# Patient Record
Sex: FEMALE | Race: WHITE | NOT HISPANIC OR LATINO
[De-identification: names, ages, dates, MRNs, and addresses within clinical notes are randomized per-mention and may not be internally consistent; named-entity substitution may affect disease eponyms.]

---

## 2017-05-09 ENCOUNTER — APPOINTMENT (OUTPATIENT)
Dept: BREAST CENTER | Facility: CLINIC | Age: 72
End: 2017-05-09

## 2017-05-09 VITALS
BODY MASS INDEX: 23 KG/M2 | DIASTOLIC BLOOD PRESSURE: 82 MMHG | HEIGHT: 62 IN | SYSTOLIC BLOOD PRESSURE: 130 MMHG | WEIGHT: 125 LBS

## 2017-05-09 DIAGNOSIS — Z78.9 OTHER SPECIFIED HEALTH STATUS: ICD-10-CM

## 2017-05-09 RX ORDER — GLUCOSAMINE/MSM/CHONDROIT SULF 500-166.6
TABLET ORAL
Refills: 0 | Status: ACTIVE | COMMUNITY

## 2017-10-10 ENCOUNTER — APPOINTMENT (OUTPATIENT)
Dept: BREAST CENTER | Facility: CLINIC | Age: 72
End: 2017-10-10
Payer: MEDICARE

## 2017-10-10 VITALS
DIASTOLIC BLOOD PRESSURE: 74 MMHG | HEIGHT: 62 IN | HEART RATE: 78 BPM | OXYGEN SATURATION: 97 % | WEIGHT: 125 LBS | SYSTOLIC BLOOD PRESSURE: 110 MMHG | BODY MASS INDEX: 23 KG/M2

## 2017-10-10 PROCEDURE — 99213 OFFICE O/P EST LOW 20 MIN: CPT

## 2018-04-10 ENCOUNTER — APPOINTMENT (OUTPATIENT)
Dept: BREAST CENTER | Facility: CLINIC | Age: 73
End: 2018-04-10
Payer: MEDICARE

## 2018-04-10 VITALS
HEART RATE: 70 BPM | WEIGHT: 132 LBS | OXYGEN SATURATION: 96 % | SYSTOLIC BLOOD PRESSURE: 118 MMHG | BODY MASS INDEX: 24.29 KG/M2 | DIASTOLIC BLOOD PRESSURE: 74 MMHG | HEIGHT: 62 IN

## 2018-04-10 PROCEDURE — 99213 OFFICE O/P EST LOW 20 MIN: CPT

## 2018-10-16 ENCOUNTER — APPOINTMENT (OUTPATIENT)
Dept: BREAST CENTER | Facility: CLINIC | Age: 73
End: 2018-10-16
Payer: MEDICARE

## 2018-10-16 VITALS
DIASTOLIC BLOOD PRESSURE: 82 MMHG | OXYGEN SATURATION: 98 % | WEIGHT: 128 LBS | SYSTOLIC BLOOD PRESSURE: 130 MMHG | BODY MASS INDEX: 23.55 KG/M2 | HEART RATE: 72 BPM | HEIGHT: 62 IN

## 2018-10-16 PROCEDURE — 99213 OFFICE O/P EST LOW 20 MIN: CPT

## 2019-06-18 ENCOUNTER — APPOINTMENT (OUTPATIENT)
Dept: BREAST CENTER | Facility: CLINIC | Age: 74
End: 2019-06-18

## 2019-08-15 ENCOUNTER — APPOINTMENT (OUTPATIENT)
Dept: BREAST CENTER | Facility: CLINIC | Age: 74
End: 2019-08-15

## 2019-12-19 ENCOUNTER — APPOINTMENT (OUTPATIENT)
Dept: BREAST CENTER | Facility: CLINIC | Age: 74
End: 2019-12-19
Payer: MEDICARE

## 2019-12-19 VITALS
HEIGHT: 62 IN | DIASTOLIC BLOOD PRESSURE: 82 MMHG | BODY MASS INDEX: 23 KG/M2 | SYSTOLIC BLOOD PRESSURE: 132 MMHG | TEMPERATURE: 98.1 F | WEIGHT: 125 LBS

## 2019-12-19 PROCEDURE — 99214 OFFICE O/P EST MOD 30 MIN: CPT

## 2019-12-19 NOTE — REASON FOR VISIT
[Consultation] : a consultation visit [FreeTextEntry1] : Right breast cancer; PMD is Dr. Tolentino.

## 2019-12-19 NOTE — PHYSICAL EXAM
[Normocephalic] : normocephalic [No Supraclavicular Adenopathy] : no supraclavicular adenopathy [Supple] : supple [Atraumatic] : atraumatic [No Cervical Adenopathy] : no cervical adenopathy [No Thyromegaly] : no thyromegaly [Examined in the supine and seated position] : examined in the supine and seated position [Symmetrical] : symmetrical [No dominant masses] : no dominant masses in right breast  [No dominant masses] : no dominant masses left breast [Breast Mass Right Breast ___cm] : no masses [No Nipple Discharge] : no left nipple discharge [No Nipple Retraction] : no left nipple retraction [Breast Mass Left Breast ___cm] : no masses [No Axillary Lymphadenopathy] : no left axillary lymphadenopathy [No Swelling] : no swelling [Full ROM] : full range of motion [No Edema] : no edema [No Ulceration] : no ulceration [No Rashes] : no rashes [Breast Nipple Retraction] : nipples not retracted [Breast Nipple Inversion] : nipples not inverted [Breast Nipple Fissures] : nipples not fissured [Breast Abnormal Lactation (Galactorrhea)] : no galactorrhea [Breast Nipple Flattening] : nipples not flattened [Breast Abnormal Secretion Bloody Fluid] : no bloody discharge [Breast Abnormal Secretion Serous Fluid] : no serous discharge [Breast Abnormal Secretion Opalescent Fluid] : no milky discharge

## 2019-12-19 NOTE — DATA REVIEWED
[FreeTextEntry1] : Study Date:   23 Oct 2019 19:36 \par Referring Phys.:  NEHAL PARADA Performing Location:   [facility]  \par EXAM:  MAMMO COMBO HD ARON SCREENING BILATERAL \par --------------------------------------------------------------------------------\par IMPRESSION:\par Indeterminate mass at right 9:00, recommend Limited right ultrasound for further evaluation.\par An additional imaging exam of the right breast(s) is recommended. The patient will be sent a letter to return for additional imaging.\par BI-RADS 0: INCOMPLETE - NEED ADDITIONAL IMAGING EVALUATION\par MAMMO TOMOSYNTHESIS SCREENING BILATERAL\par Clinical Breast Exam: Patient does report clinical breast exam in the last year.\par Clinical Indication: Routine screening. No family history of breast cancer. Patient has a personal history of right breast cancer.\par Compared to: 10/09/2018, 10/05/2017, 09/27/2016, 09/04/2015, 03/13/2015, and 07/09/2014\par Tomosynthesis and 2D imaging of the breast(s) were performed. Current study was also evaluated with a computer aided detection (CAD) system.\par Breast density: There are scattered areas of fibroglandular density.\par No significant masses, calcifications, or other findings are seen in the left breast. In the right breast. 9:00 3 cm from nipple there is a new circumscribed 0.6 cm oval mass. There are postsurgical changes in the right upper outer quadrant.\par Electronic Signature: I personally reviewed the images and agree with this report. Final Report: Dictated by and Signed by Attending Natalie Morgan MD 10/28/2019 1:35 PM\par \par \par \par  \par \par Study Date:   13 Nov 2019 09:42 \par Referring Phys.:  ELDER BASURTO Performing Location:   [facility]  \par EXAM:  US BREAST \par --------------------------------------------------------------------------------\par IMPRESSION:\par Indeterminant hypoechoic mass in the right breast at 9:00 location 3 cm from the nipple corresponding to the mammographic abnormality for which ultrasound core biopsy is recommended.\par Biopsy of the right breast(s) is recommended. A letter will be sent to the patient to return for a biopsy.\par BI-RADS 4: SUSPICIOUS\par US BREAST COMPLETE RIGHT\par Clinical history: Recalled from a recent screening mammogram\par Compared to: 10/05/2017, 09/04/2015, 07/15/2014, 07/15/2014, 07/15/2014, and 07/09/2014\par A limited ultrasound evaluation of the breast(s) was/were performed.\par In the right breast at 9:00 location 3 cm from the nipple corresponding to the mammographic abnormality there is a hypoechoic mass measuring 0.6 x 0.5 x 0.5 cm. Ultrasound core biopsy is recommended for further evaluation. Postsurgical scar is seen in the upper outer quadrant of the right breast.\par Electronic Signature: I personally reviewed the images and agree with this report. Final Report: Dictated by and Signed by Attending Kerrie Huitron MD 11/13/2019 10:29 AM\par \par \par \par  \par \par Study Date:   03 Dec 2019 08:57 \par Referring Phys.:  NEHAL PARADA Performing Location:   [facility]  \par EXAM:  US BREAST CORE BIOPSY \par --------------------------------------------------------------------------------\par Pathology Report Received From [facility]:\par The pathology report of breast biopsy dated indicated that the target at is MALIGNANT\par DIAGNOSIS:\par 1. 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 She has been advised to contact your office and to arrange for SURGICAL AND ONCOLOGIC CONSULTATION AND MANAGEMENT.\par Surgical consultation of the right breast(s) is recommended. _\par Electronic Signature: I personally reviewed the images and agree with this report. Final Report: Dictated by and Signed by Attending Danica Mccartney MD 12/10/2019 3:14 PM\par *******END OF ADDENDUM******\par \par IMPRESSION:\par Ultrasound guided needle biopsy of the right breast. Pathology pending.\par A final report will be issued when Pathology results are available. _\par US BREAST CORE BIOPSY RIGHT, MAMMO DIGITAL POST PROCEDURE RIGHT\par HISTORY: Ultrasound of the right breast dated 11/13/2019 revealed a nodule at 9 o'clock which was recommended for biopsy.\par Benefits, risks and alternatives to the procedure were explained to the patient and informed written consent was obtained.\par The patient denied taking aspirin or anticoagulants and stated no allergies to topical antiseptic solutions or local anesthetic.\par Utilizing universal protocol, site and patient verification was performed prior to starting the procedure.\par PROCEDURE: After sterile skin preparation, local anesthesia was achieved with 1% lidocaine. Under ultrasound guidance, a 12G vacuum assisted needle biopsy device was used to obtain multiple core specimens.\par After the procedure, a S shaped marking clip was deployed in the area of sampling.\par The patient tolerated the procedure well without immediate complications.\par POST PROCEDURE MAMMOGRAM FOR MARKER PLACEMENT\par A post-procedure mammogram was performed and demonstrates a clip in the appropriate location.\par Electronic Signature: I personally reviewed the images and agree with this report. Final Report: Dictated by and Signed by Attending Danica Mccartney MD 12/3/2019 10:15 AM

## 2019-12-19 NOTE — ASSESSMENT
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   She has a history of right breast DCIS of two areas in 2014 status post lumpectomy and intraoperative radiation therapy demonstrating 4 mm moderately differentiated invasive ductal carcinoma.  She was then status post right sentinel lymph node biopsy demonstrating one negative lymph node.  She was on an aromatase inhibitor but discontinued after three months secondary to side effects.  \par \par On physical exam, there are no discreet masses in either breast or axilla.  There is no nipple discharge or inversion bilaterally.  There are no skin changes bilaterally.  We had a lengthy discussion regarding her diagnosis and treatment options.  She agrees to right breast lumpectomy with needle localization. All of the risks and benefits were discussed.  I spent a total of 25 minutes of face to face time with this patient, greater than 50% of which was spent in counseling and/or coordination of care.  All of her questions were appropriately answered.\par

## 2019-12-19 NOTE — REVIEW OF SYSTEMS
[Fever] : no fever [Chills] : no chills [Breast Lump] : no breast lump [Breast Pain] : no breast pain [Breast Warmth] : no breast warmth [Breast Reddening] : no reddening of the breast [Breast Swelling] : no breast swelling [Breast Itching] : no breast itching [Enlargement] : no breast enlargement [Decreasing In Size] : breast size not decreasing [Dimpling Of Skin] : no dimpling of breast skin ['Orange Peel' Appearance] : no 'orange peel' appearance of breast skin [Nipple Discharge] : no nipple discharge [Nipple Inverted] : no inversion of the nipple

## 2019-12-19 NOTE — HISTORY OF PRESENT ILLNESS
[FreeTextEntry1] : Patient with Right breast DCIS intermediate nuclear grade, papillary on ultrasound core biopsy 7/15/14; 10:00 N6-7, 18 mm.  \par Estrogen receptor positive\par Progesterone receptor positive\par \par Right breast DCIS intermediate nuclear grade, solid and papillary on ultrasound core biopsy 7/15/14; 11:00 N6-7, 3 mm.  \par \par History of Left breast biopsy for benign calcifications.\par Her family history is significant for her niece (sister's daughter) with breast cancer at 51.\par \par s/p Right NLOC of two areas and XOFT 9/15/14 demonstrating 4 mm mod diff IDC, with negative margins; extensive DCIS, micropapillary and solid types, intermediate nuclear grade, close superior, posterior and anterior margin.   No LVI or perineural invasion.\par \par Status post Right SNB 10/27/14 demonstrating 0/1 (-).  (pT1a pN0 pMx)\par \par Patient met with Dr. Wilkes and was on AI for three months, eventually discontinued  Arimidex and letrozole due to side effects.  She was not a candidate for chemotherapy.  \par \par Right breast at least in situ ductal carcinoma with papillary and micropapillary features (DCIS) intermediate nuclear grade on ultrasound core biopsy 12/3/19; 9:00 N3, 6 mm (S-shape).\par Estrogen receptor positive, \par Progesterone receptor positive,

## 2019-12-19 NOTE — PAST MEDICAL HISTORY
[Postmenopausal] : The patient is postmenopausal [FreeTextEntry5] : none [FreeTextEntry6] : none [FreeTextEntry7] : none [FreeTextEntry8] : none

## 2019-12-31 ENCOUNTER — LABORATORY RESULT (OUTPATIENT)
Age: 74
End: 2019-12-31

## 2019-12-31 ENCOUNTER — OUTPATIENT (OUTPATIENT)
Dept: OUTPATIENT SERVICES | Facility: HOSPITAL | Age: 74
LOS: 1 days | Discharge: HOME | End: 2019-12-31

## 2019-12-31 DIAGNOSIS — R89.7 ABNORMAL HISTOLOGICAL FINDINGS IN SPECIMENS FROM OTHER ORGANS, SYSTEMS AND TISSUES: ICD-10-CM

## 2020-01-01 ENCOUNTER — FORM ENCOUNTER (OUTPATIENT)
Age: 75
End: 2020-01-01

## 2020-01-02 ENCOUNTER — OUTPATIENT (OUTPATIENT)
Dept: OUTPATIENT SERVICES | Facility: HOSPITAL | Age: 75
LOS: 1 days | Discharge: HOME | End: 2020-01-02
Payer: MEDICARE

## 2020-01-02 ENCOUNTER — OUTPATIENT (OUTPATIENT)
Dept: OUTPATIENT SERVICES | Facility: HOSPITAL | Age: 75
LOS: 1 days | Discharge: HOME | End: 2020-01-02

## 2020-01-02 VITALS
HEIGHT: 62 IN | TEMPERATURE: 98 F | WEIGHT: 121.92 LBS | DIASTOLIC BLOOD PRESSURE: 76 MMHG | HEART RATE: 76 BPM | SYSTOLIC BLOOD PRESSURE: 130 MMHG | RESPIRATION RATE: 16 BRPM | OXYGEN SATURATION: 98 %

## 2020-01-02 DIAGNOSIS — Z01.818 ENCOUNTER FOR OTHER PREPROCEDURAL EXAMINATION: ICD-10-CM

## 2020-01-02 DIAGNOSIS — D05.11 INTRADUCTAL CARCINOMA IN SITU OF RIGHT BREAST: ICD-10-CM

## 2020-01-02 DIAGNOSIS — Z98.890 OTHER SPECIFIED POSTPROCEDURAL STATES: Chronic | ICD-10-CM

## 2020-01-02 DIAGNOSIS — Z02.9 ENCOUNTER FOR ADMINISTRATIVE EXAMINATIONS, UNSPECIFIED: ICD-10-CM

## 2020-01-02 LAB
ALBUMIN SERPL ELPH-MCNC: 4.4 G/DL — SIGNIFICANT CHANGE UP (ref 3.5–5.2)
ALP SERPL-CCNC: 71 U/L — SIGNIFICANT CHANGE UP (ref 30–115)
ALT FLD-CCNC: 22 U/L — SIGNIFICANT CHANGE UP (ref 0–41)
ANION GAP SERPL CALC-SCNC: 15 MMOL/L — HIGH (ref 7–14)
APTT BLD: 31.4 SEC — SIGNIFICANT CHANGE UP (ref 27–39.2)
AST SERPL-CCNC: 28 U/L — SIGNIFICANT CHANGE UP (ref 0–41)
BASOPHILS # BLD AUTO: 0.1 K/UL — SIGNIFICANT CHANGE UP (ref 0–0.2)
BASOPHILS NFR BLD AUTO: 1.3 % — HIGH (ref 0–1)
BILIRUB SERPL-MCNC: 0.4 MG/DL — SIGNIFICANT CHANGE UP (ref 0.2–1.2)
BUN SERPL-MCNC: 13 MG/DL — SIGNIFICANT CHANGE UP (ref 10–20)
CALCIUM SERPL-MCNC: 9.7 MG/DL — SIGNIFICANT CHANGE UP (ref 8.5–10.1)
CHLORIDE SERPL-SCNC: 95 MMOL/L — LOW (ref 98–110)
CO2 SERPL-SCNC: 27 MMOL/L — SIGNIFICANT CHANGE UP (ref 17–32)
CREAT SERPL-MCNC: 0.7 MG/DL — SIGNIFICANT CHANGE UP (ref 0.7–1.5)
EOSINOPHIL # BLD AUTO: 0.15 K/UL — SIGNIFICANT CHANGE UP (ref 0–0.7)
EOSINOPHIL NFR BLD AUTO: 2 % — SIGNIFICANT CHANGE UP (ref 0–8)
GLUCOSE SERPL-MCNC: 62 MG/DL — LOW (ref 70–99)
HCT VFR BLD CALC: 39.5 % — SIGNIFICANT CHANGE UP (ref 37–47)
HGB BLD-MCNC: 12.7 G/DL — SIGNIFICANT CHANGE UP (ref 12–16)
IMM GRANULOCYTES NFR BLD AUTO: 0.3 % — SIGNIFICANT CHANGE UP (ref 0.1–0.3)
INR BLD: 1.03 RATIO — SIGNIFICANT CHANGE UP (ref 0.65–1.3)
LYMPHOCYTES # BLD AUTO: 1.84 K/UL — SIGNIFICANT CHANGE UP (ref 1.2–3.4)
LYMPHOCYTES # BLD AUTO: 24.4 % — SIGNIFICANT CHANGE UP (ref 20.5–51.1)
MCHC RBC-ENTMCNC: 31.1 PG — HIGH (ref 27–31)
MCHC RBC-ENTMCNC: 32.2 G/DL — SIGNIFICANT CHANGE UP (ref 32–37)
MCV RBC AUTO: 96.8 FL — SIGNIFICANT CHANGE UP (ref 81–99)
MONOCYTES # BLD AUTO: 0.61 K/UL — HIGH (ref 0.1–0.6)
MONOCYTES NFR BLD AUTO: 8.1 % — SIGNIFICANT CHANGE UP (ref 1.7–9.3)
NEUTROPHILS # BLD AUTO: 4.82 K/UL — SIGNIFICANT CHANGE UP (ref 1.4–6.5)
NEUTROPHILS NFR BLD AUTO: 63.9 % — SIGNIFICANT CHANGE UP (ref 42.2–75.2)
NRBC # BLD: 0 /100 WBCS — SIGNIFICANT CHANGE UP (ref 0–0)
PLATELET # BLD AUTO: 388 K/UL — SIGNIFICANT CHANGE UP (ref 130–400)
POTASSIUM SERPL-MCNC: 4.2 MMOL/L — SIGNIFICANT CHANGE UP (ref 3.5–5)
POTASSIUM SERPL-SCNC: 4.2 MMOL/L — SIGNIFICANT CHANGE UP (ref 3.5–5)
PROT SERPL-MCNC: 6.9 G/DL — SIGNIFICANT CHANGE UP (ref 6–8)
PROTHROM AB SERPL-ACNC: 11.8 SEC — SIGNIFICANT CHANGE UP (ref 9.95–12.87)
RBC # BLD: 4.08 M/UL — LOW (ref 4.2–5.4)
RBC # FLD: 12.4 % — SIGNIFICANT CHANGE UP (ref 11.5–14.5)
SODIUM SERPL-SCNC: 137 MMOL/L — SIGNIFICANT CHANGE UP (ref 135–146)
WBC # BLD: 7.54 K/UL — SIGNIFICANT CHANGE UP (ref 4.8–10.8)
WBC # FLD AUTO: 7.54 K/UL — SIGNIFICANT CHANGE UP (ref 4.8–10.8)

## 2020-01-02 PROCEDURE — 71046 X-RAY EXAM CHEST 2 VIEWS: CPT | Mod: 26

## 2020-01-02 PROCEDURE — 93010 ELECTROCARDIOGRAM REPORT: CPT

## 2020-01-02 NOTE — H&P PST ADULT - HISTORY OF PRESENT ILLNESS
74yr old female states was diagnosed with RT BREAST CANCER-PRESENTS TO PRETESTING FOR rt breast lumpectomy. Denies c/o CP, PALP, SOB, URI, FEVER, RASH OR UTI SYMPTOMS. Exercise danielle 2 FOS.

## 2020-01-07 ENCOUNTER — FORM ENCOUNTER (OUTPATIENT)
Age: 75
End: 2020-01-07

## 2020-01-07 PROBLEM — C50.919 MALIGNANT NEOPLASM OF UNSPECIFIED SITE OF UNSPECIFIED FEMALE BREAST: Chronic | Status: ACTIVE | Noted: 2020-01-02

## 2020-01-07 PROBLEM — I10 ESSENTIAL (PRIMARY) HYPERTENSION: Chronic | Status: ACTIVE | Noted: 2020-01-02

## 2020-01-08 ENCOUNTER — OUTPATIENT (OUTPATIENT)
Dept: OUTPATIENT SERVICES | Facility: HOSPITAL | Age: 75
LOS: 1 days | Discharge: HOME | End: 2020-01-08
Payer: MEDICARE

## 2020-01-08 ENCOUNTER — RESULT REVIEW (OUTPATIENT)
Age: 75
End: 2020-01-08

## 2020-01-08 ENCOUNTER — APPOINTMENT (OUTPATIENT)
Dept: BREAST CENTER | Facility: AMBULATORY SURGERY CENTER | Age: 75
End: 2020-01-08
Payer: MEDICARE

## 2020-01-08 VITALS
DIASTOLIC BLOOD PRESSURE: 65 MMHG | RESPIRATION RATE: 16 BRPM | HEART RATE: 63 BPM | SYSTOLIC BLOOD PRESSURE: 128 MMHG | OXYGEN SATURATION: 96 %

## 2020-01-08 VITALS
OXYGEN SATURATION: 99 % | WEIGHT: 121.92 LBS | HEART RATE: 68 BPM | RESPIRATION RATE: 18 BRPM | HEIGHT: 62 IN | TEMPERATURE: 99 F | DIASTOLIC BLOOD PRESSURE: 74 MMHG | SYSTOLIC BLOOD PRESSURE: 127 MMHG

## 2020-01-08 DIAGNOSIS — Z98.890 OTHER SPECIFIED POSTPROCEDURAL STATES: Chronic | ICD-10-CM

## 2020-01-08 PROCEDURE — 88305 TISSUE EXAM BY PATHOLOGIST: CPT | Mod: 26

## 2020-01-08 PROCEDURE — 19281 PERQ DEVICE BREAST 1ST IMAG: CPT | Mod: RT

## 2020-01-08 PROCEDURE — 71045 X-RAY EXAM CHEST 1 VIEW: CPT | Mod: 26,76

## 2020-01-08 PROCEDURE — 19301 PARTIAL MASTECTOMY: CPT | Mod: RT

## 2020-01-08 RX ORDER — OXYCODONE HYDROCHLORIDE 5 MG/1
5 TABLET ORAL ONCE
Refills: 0 | Status: DISCONTINUED | OUTPATIENT
Start: 2020-01-08 | End: 2020-01-08

## 2020-01-08 RX ORDER — ONDANSETRON 8 MG/1
4 TABLET, FILM COATED ORAL ONCE
Refills: 0 | Status: DISCONTINUED | OUTPATIENT
Start: 2020-01-08 | End: 2020-02-03

## 2020-01-08 RX ORDER — MORPHINE SULFATE 50 MG/1
1 CAPSULE, EXTENDED RELEASE ORAL
Refills: 0 | Status: DISCONTINUED | OUTPATIENT
Start: 2020-01-08 | End: 2020-02-03

## 2020-01-08 RX ORDER — SODIUM CHLORIDE 9 MG/ML
1000 INJECTION, SOLUTION INTRAVENOUS
Refills: 0 | Status: DISCONTINUED | OUTPATIENT
Start: 2020-01-08 | End: 2020-02-03

## 2020-01-08 RX ORDER — MORPHINE SULFATE 50 MG/1
2 CAPSULE, EXTENDED RELEASE ORAL
Refills: 0 | Status: DISCONTINUED | OUTPATIENT
Start: 2020-01-08 | End: 2020-01-08

## 2020-01-08 RX ORDER — OXYCODONE AND ACETAMINOPHEN 5; 325 MG/1; MG/1
5-325 TABLET ORAL
Qty: 10 | Refills: 0 | Status: COMPLETED | COMMUNITY
Start: 2020-01-08 | End: 2020-01-13

## 2020-01-08 RX ADMIN — OXYCODONE HYDROCHLORIDE 5 MILLIGRAM(S): 5 TABLET ORAL at 16:05

## 2020-01-08 RX ADMIN — SODIUM CHLORIDE 100 MILLILITER(S): 9 INJECTION, SOLUTION INTRAVENOUS at 15:04

## 2020-01-08 NOTE — CHART NOTE - NSCHARTNOTEFT_GEN_A_CORE
PACU ANESTHESIA ADMISSION NOTE      Procedure: Lumpectomy of right breast after needle localization    Post op diagnosis:  Ductal carcinoma in situ (DCIS) of right breast      _x___  Patent Airway    __x__  Full return of protective reflexes    __x__  Full recovery from anesthesia / back to baseline status    Vitals:  T(C): 97.5 F  HR: 78  BP: 113/65  RR: 24  SpO2: 100%    Mental Status:  __x__ Awake   __x___ Alert   _____ Drowsy   _____ Sedated    Nausea/Vomiting:  ___x_ NO  ______Yes,   See Post - Op Orders          Pain Scale (0-10):  _____    Treatment: ____ None    _x___ See Post - Op/PCA Orders    Post - Operative Fluids:   ____ Oral   _x___ See Post - Op Orders    Plan: Discharge:   __x__Home       _____Floor     _____Critical Care    _____  Other:_________________    Comments: uneventful anesthesia course no complications. Vitals stable. Pt transferred to PACU

## 2020-01-08 NOTE — BRIEF OPERATIVE NOTE - NSICDXBRIEFPREOP_GEN_ALL_CORE_FT
PRE-OP DIAGNOSIS:  Ductal carcinoma in situ (DCIS) of right breast 08-Jan-2020 14:56:53  Julissa De Leon

## 2020-01-08 NOTE — BRIEF OPERATIVE NOTE - NSICDXBRIEFPROCEDURE_GEN_ALL_CORE_FT
PROCEDURES:  Lumpectomy of right breast after needle localization 08-Jan-2020 14:56:43  Julissa De Leon

## 2020-01-08 NOTE — ASU DISCHARGE PLAN (ADULT/PEDIATRIC) - PROVIDER TOKENS
PROVIDER:[TOKEN:[[id number]],SCHEDULEDAPPT:[01/16/2020],SCHEDULEDAPPTTIME:[01:30 PM],ESTABLISHEDPATIENT:[T]]

## 2020-01-08 NOTE — ASU DISCHARGE PLAN (ADULT/PEDIATRIC) - CARE PROVIDER_API CALL
Qing Gardner)  Surgery  [address] [place] [address], [address] [place] [address]  Phone: [phone]  Fax: [phone]  Established Patient  Scheduled Appointment: 01/16/2020 01:30 PM

## 2020-01-08 NOTE — ASU DISCHARGE PLAN (ADULT/PEDIATRIC) - ASU DC SPECIAL INSTRUCTIONSFT
Regular Diet.    No heavy lifting more than 15 pounds for two weeks.    Please remove plastic dressings in three days.    LEAVE WHITE TAPE AND STITCHES IN PLACE.      DO NOT CUT STITCHES.      No shaving for 4 weeks if surgery was performed in or near the axilla (armpit).      May shower tomorrow.  Wear a supportive bra.

## 2020-01-08 NOTE — BRIEF OPERATIVE NOTE - NSICDXBRIEFPOSTOP_GEN_ALL_CORE_FT
POST-OP DIAGNOSIS:  Ductal carcinoma in situ (DCIS) of right breast 08-Jan-2020 14:57:03  Julissa De Leon

## 2020-01-15 LAB — SURGICAL PATHOLOGY STUDY: SIGNIFICANT CHANGE UP

## 2020-01-16 ENCOUNTER — APPOINTMENT (OUTPATIENT)
Dept: BREAST CENTER | Facility: CLINIC | Age: 75
End: 2020-01-16
Payer: MEDICARE

## 2020-01-16 DIAGNOSIS — D05.11 INTRADUCTAL CARCINOMA IN SITU OF RIGHT BREAST: ICD-10-CM

## 2020-01-16 DIAGNOSIS — N60.91 UNSPECIFIED BENIGN MAMMARY DYSPLASIA OF RIGHT BREAST: ICD-10-CM

## 2020-01-16 DIAGNOSIS — N64.1 FAT NECROSIS OF BREAST: ICD-10-CM

## 2020-01-16 DIAGNOSIS — N63.10 UNSPECIFIED LUMP IN THE RIGHT BREAST, UNSPECIFIED QUADRANT: ICD-10-CM

## 2020-01-21 ENCOUNTER — APPOINTMENT (OUTPATIENT)
Dept: BREAST CENTER | Facility: CLINIC | Age: 75
End: 2020-01-21
Payer: MEDICARE

## 2020-01-21 VITALS — WEIGHT: 125 LBS | HEIGHT: 62 IN | BODY MASS INDEX: 23 KG/M2

## 2020-01-21 PROCEDURE — 99024 POSTOP FOLLOW-UP VISIT: CPT

## 2020-01-23 VITALS
HEIGHT: 62 IN | SYSTOLIC BLOOD PRESSURE: 132 MMHG | DIASTOLIC BLOOD PRESSURE: 82 MMHG | TEMPERATURE: 98 F | WEIGHT: 125 LBS

## 2020-01-23 NOTE — H&P PST ADULT - HISTORY OF PRESENT ILLNESS
Patient with Right breast DCIS intermediate nuclear grade, papillary on ultrasound core biopsy 7/15/14; 10:00 N6-7, 18 mm.   Estrogen receptor positive  Progesterone receptor positive    Right breast DCIS intermediate nuclear grade, solid and papillary on ultrasound core biopsy 7/15/14; 11:00 N6-7, 3 mm.     History of Left breast biopsy for benign calcifications.  Her family history is significant for her niece (sister's daughter) with breast cancer at 51.    s/p Right NLOC of two areas and XOFT 9/15/14 demonstrating 4 mm mod diff IDC, with negative margins; extensive DCIS, micropapillary and solid types, intermediate nuclear grade, close superior, posterior and anterior margin. No LVI or perineural invasion.    Status post Right SNB 10/27/14 demonstrating 0/1 (-). (pT1a pN0 pMx)    Patient met with Dr. Wlikes and was on AI for three months, eventually discontinued Arimidex and letrozole due to side effects. She was not a candidate for chemotherapy.     Right breast at least in situ ductal carcinoma with papillary and micropapillary features (DCIS) intermediate nuclear grade on ultrasound core biopsy 12/3/19; 9:00 N3, 6 mm (S-shape).  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 AJCC 8th Edition Pathologic Stage: pTis (DCIS), pNx, pMx.

## 2020-01-27 ENCOUNTER — OUTPATIENT (OUTPATIENT)
Dept: OUTPATIENT SERVICES | Facility: HOSPITAL | Age: 75
LOS: 1 days | Discharge: HOME | End: 2020-01-27
Payer: MEDICARE

## 2020-01-27 ENCOUNTER — RESULT REVIEW (OUTPATIENT)
Age: 75
End: 2020-01-27

## 2020-01-27 ENCOUNTER — APPOINTMENT (OUTPATIENT)
Dept: BREAST CENTER | Facility: AMBULATORY SURGERY CENTER | Age: 75
End: 2020-01-27
Payer: MEDICARE

## 2020-01-27 VITALS
RESPIRATION RATE: 21 BRPM | HEART RATE: 64 BPM | DIASTOLIC BLOOD PRESSURE: 71 MMHG | OXYGEN SATURATION: 100 % | SYSTOLIC BLOOD PRESSURE: 130 MMHG

## 2020-01-27 DIAGNOSIS — Z98.890 OTHER SPECIFIED POSTPROCEDURAL STATES: Chronic | ICD-10-CM

## 2020-01-27 PROCEDURE — 88305 TISSUE EXAM BY PATHOLOGIST: CPT | Mod: 26

## 2020-01-27 PROCEDURE — 19301 PARTIAL MASTECTOMY: CPT | Mod: RT,58

## 2020-01-27 RX ORDER — OXYCODONE AND ACETAMINOPHEN 5; 325 MG/1; MG/1
1 TABLET ORAL ONCE
Refills: 0 | Status: DISCONTINUED | OUTPATIENT
Start: 2020-01-27 | End: 2020-01-27

## 2020-01-27 RX ORDER — SODIUM CHLORIDE 9 MG/ML
1000 INJECTION, SOLUTION INTRAVENOUS
Refills: 0 | Status: DISCONTINUED | OUTPATIENT
Start: 2020-01-27 | End: 2020-02-11

## 2020-01-27 RX ORDER — OXYCODONE AND ACETAMINOPHEN 5; 325 MG/1; MG/1
5-325 TABLET ORAL
Qty: 10 | Refills: 0 | Status: COMPLETED | COMMUNITY
Start: 2020-01-27 | End: 2020-02-01

## 2020-01-27 RX ORDER — HYDROMORPHONE HYDROCHLORIDE 2 MG/ML
0.5 INJECTION INTRAMUSCULAR; INTRAVENOUS; SUBCUTANEOUS
Refills: 0 | Status: DISCONTINUED | OUTPATIENT
Start: 2020-01-27 | End: 2020-01-27

## 2020-01-27 RX ORDER — ONDANSETRON 8 MG/1
4 TABLET, FILM COATED ORAL ONCE
Refills: 0 | Status: COMPLETED | OUTPATIENT
Start: 2020-01-27 | End: 2020-01-27

## 2020-01-27 RX ADMIN — ONDANSETRON 4 MILLIGRAM(S): 8 TABLET, FILM COATED ORAL at 10:48

## 2020-01-27 RX ADMIN — SODIUM CHLORIDE 100 MILLILITER(S): 9 INJECTION, SOLUTION INTRAVENOUS at 10:18

## 2020-01-27 NOTE — ASU DISCHARGE PLAN (ADULT/PEDIATRIC) - CARE PROVIDER_API CALL
Qing Gardner)  Surgery  [address] [place] [address], [address] [place] [address]  Phone: [phone]  Fax: [phone]  Established Patient  Scheduled Appointment: 02/06/2020 03:30 PM

## 2020-01-27 NOTE — CHART NOTE - NSCHARTNOTEFT_GEN_A_CORE
PACU ANESTHESIA ADMISSION NOTE      Procedure: Breast mass excision: Right breast mass re-excision of superior and lateral margins.    Post op diagnosis:  Ductal carcinoma in situ of right breast      ____  Intubated  TV:______       Rate: ______      FiO2: ______    _x___  Patent Airway    _x___  Full return of protective reflexes    _x___  Full recovery from anesthesia / back to baseline status    Vitals:            T:97.8                BP : 125/59               R:    18          Sat:98               P:71      Mental Status:  _x___ Awake   _____ Alert   _____ Drowsy   _____ Sedated    Nausea/Vomiting:  _x___  NO       ______Yes,   See Post - Op Orders         Pain Scale (0-10):  __0___    Treatment: _x___ None    ____ See Post - Op/PCA Orders    Post - Operative Fluids:   __x__ Oral   ____ See Post - Op Orders    Plan: Discharge:   _x___Home       _____Floor     _____Critical Care    _____  Other:_________________    Comments:  No anesthesia issues or complications noted.  Discharge when criteria met.

## 2020-01-27 NOTE — ASU DISCHARGE PLAN (ADULT/PEDIATRIC) - PROVIDER TOKENS
PROVIDER:[TOKEN:[[id number]],SCHEDULEDAPPT:[02/06/2020],SCHEDULEDAPPTTIME:[03:30 PM],ESTABLISHEDPATIENT:[T]]

## 2020-01-27 NOTE — BRIEF OPERATIVE NOTE - NSICDXBRIEFPROCEDURE_GEN_ALL_CORE_FT
PROCEDURES:  Breast mass excision 27-Jan-2020 10:14:54 Right breast mass re-excision of superior and lateral margins. Julissa De Leon

## 2020-01-27 NOTE — BRIEF OPERATIVE NOTE - SPECIMENS
Right breast lateral margin, right breast lateral margin #2, right breast superior margin, right breast superior margin #2.

## 2020-01-28 NOTE — CONSULT LETTER
[Dear  ___] : Dear  [unfilled], [Please see my note below.] : Please see my note below. [Sincerely,] : Sincerely, [FreeTextEntry2] : Alanna Tolentino M.D.\par [address] [place] [address], [address]\par [place] [phone]  [FreeTextEntry1] : This letter is in regard to our mutual patient who underwent breast lumpectomy with needle localization.  Enclosed is a copy of her pathology results.\par \par We will continue to take excellent care of your patient.\par \par Please feel free to contact our office with any questions or concerns.  [FreeTextEntry3] : Qing Gardner M.D., F.A.C.S.

## 2020-01-28 NOTE — ASSESSMENT
[FreeTextEntry1] : 74 year old female who presents today for her post operative visit. Recently, she has a history of right breast DCIS intermediate nuclear grade and atypical lobular hyperplasia on ultrasound core biopsy 12/3/19 status post right breast lumpectomy 1/8/20.   Her incision is healing well and the patient is without complaints.\par \par Her pathology results were reviewed, including ductal carcinoma in situ present at the lateral margin.  We had a lengthy discussion regarding her diagnosis and treatment options.  She agrees to right breast mass re-excision of the lateral margin.  All of the risks and benefits were discussed.  We will send a Prelude DX.  All of her questions were appropriately answered.

## 2020-01-28 NOTE — REVIEW OF SYSTEMS
[Breast Pain] : breast pain [Fever] : no fever [Chills] : no chills [de-identified] : 2-3/10 pain scale

## 2020-01-28 NOTE — HISTORY OF PRESENT ILLNESS
[FreeTextEntry1] : Patient with Right breast DCIS intermediate nuclear grade, papillary on ultrasound core biopsy 7/15/14; 10:00 N6-7, 18 mm.  \par Estrogen receptor positive\par Progesterone receptor positive\par \par Right breast DCIS intermediate nuclear grade, solid and papillary on ultrasound core biopsy 7/15/14; 11:00 N6-7, 3 mm.  \par \par History of Left breast biopsy for benign calcifications.\par Her family history is significant for her niece (sister's daughter) with breast cancer at 51.\par \par s/p Right NLOC of two areas and XOFT 9/15/14 demonstrating 4 mm mod diff IDC, with negative margins; extensive DCIS, micropapillary and solid types, intermediate nuclear grade, close superior, posterior and anterior margin.   No LVI or perineural invasion.\par \par Status post Right SNB 10/27/14 demonstrating 0/1 (-).  (pT1a pN0 pMx)\par \par Patient met with Dr. Wilkes and was on AI for three months, eventually discontinued  Arimidex and letrozole due to side effects.  She was not a candidate for chemotherapy.  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 AJCC 8th Edition Pathologic Stage: pTis (DCIS), pNx, pMx.

## 2020-01-28 NOTE — DATA REVIEWED
[FreeTextEntry1] : Surgical Final Report\par Final Diagnosis\par 1.  Breast, right 9 N3 mass, needle localized lumpectomy:\par -  Healing prior biopsy site with ductal carcinoma in-situ\par (DCIS), micropapillary and cribriform types, low to intermediate\par nuclear grade.\par -  All surgical margins are negative for tumor with the closest\par margins being anterior and lateral located 1.0 mm and 1.5 mm away\par respectively (microscopic measurements).\par -  Surrounding atrophic fatty breast tissue with focal atypical\par lobular hyperplasia (ALH) and proliferative type fibrocystic\par changes.\par 2.  Breast, right lateral margin, excision:\par -  Ductal carcinoma in-situ (DCIS), micropapillary and cribriform\par types with focal comedo necrosis, intermediate nuclear grade,\par extending to focally involve (touches ink) the nearest new\par margin.\par -  Healing prior biopsy site changes.\par 3.  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  Breast, right superior tamez #2, excision:\par -    Encapsulated papillary carcinoma (intracystic papillary\par carcinoma) with intermediate-grade nuclei and ductal carcinoma\par in-situ (DCIS), micropapillary type, intermediate nuclear grade;\par located 0.5 mm from the nearest new inked margin (microscopic\par measurement).\par -  Healing prior biopsy site changes.\par Comment: For hormone receptor protein expresiion status please\par see the pathology report from the prior needle\par core biopsy specimen ([id number]). AJCC 8th Edition\par Pathologic Stage: pTis (DCIS), pNx, pMx.\par Verified by: Duncan Srivastava M.D.\par (Electronic Signature)\par Reported on: 01/15/20 15:34 EST, [facility] [address]\par Phone: [phone]   Fax: [phone]\par

## 2020-01-30 LAB — SURGICAL PATHOLOGY STUDY: SIGNIFICANT CHANGE UP

## 2020-02-06 ENCOUNTER — APPOINTMENT (OUTPATIENT)
Dept: BREAST CENTER | Facility: CLINIC | Age: 75
End: 2020-02-06
Payer: MEDICARE

## 2020-02-06 VITALS
HEIGHT: 62 IN | DIASTOLIC BLOOD PRESSURE: 80 MMHG | BODY MASS INDEX: 22.63 KG/M2 | TEMPERATURE: 98.7 F | WEIGHT: 123 LBS | SYSTOLIC BLOOD PRESSURE: 132 MMHG

## 2020-02-06 PROCEDURE — 99024 POSTOP FOLLOW-UP VISIT: CPT

## 2020-02-21 ENCOUNTER — OUTPATIENT (OUTPATIENT)
Dept: OUTPATIENT SERVICES | Facility: HOSPITAL | Age: 75
LOS: 1 days | Discharge: HOME | End: 2020-02-21

## 2020-02-21 ENCOUNTER — APPOINTMENT (OUTPATIENT)
Dept: HEMATOLOGY ONCOLOGY | Facility: CLINIC | Age: 75
End: 2020-02-21
Payer: MEDICARE

## 2020-02-21 VITALS
TEMPERATURE: 98.2 F | HEART RATE: 72 BPM | BODY MASS INDEX: 22.82 KG/M2 | WEIGHT: 124 LBS | HEIGHT: 62 IN | SYSTOLIC BLOOD PRESSURE: 150 MMHG | DIASTOLIC BLOOD PRESSURE: 77 MMHG

## 2020-02-21 DIAGNOSIS — Z80.3 FAMILY HISTORY OF MALIGNANT NEOPLASM OF BREAST: ICD-10-CM

## 2020-02-21 DIAGNOSIS — Z98.890 OTHER SPECIFIED POSTPROCEDURAL STATES: Chronic | ICD-10-CM

## 2020-02-21 PROCEDURE — 99204 OFFICE O/P NEW MOD 45 MIN: CPT

## 2020-02-21 RX ORDER — BUSPIRONE HYDROCHLORIDE 5 MG/1
5 TABLET ORAL DAILY
Refills: 0 | Status: ACTIVE | COMMUNITY
Start: 2020-02-21

## 2020-02-21 RX ORDER — MELATONIN 5 MG
5 CAPSULE ORAL
Qty: 30 | Refills: 3 | Status: ACTIVE | COMMUNITY
Start: 2020-02-21

## 2020-02-21 RX ORDER — CHOLECALCIFEROL (VITAMIN D3) 1250 MCG
1.25 MG CAPSULE ORAL
Refills: 0 | Status: ACTIVE | COMMUNITY
Start: 2020-02-21

## 2020-02-23 NOTE — PHYSICAL EXAM
[Fully active, able to carry on all pre-disease performance without restriction] : Status 0 - Fully active, able to carry on all pre-disease performance without restriction [Normal] : affect appropriate [de-identified] : well-appearing [de-identified] : Healed lumpectomy scars R breast. No palpable abnormalities in left breast.

## 2020-02-23 NOTE — HISTORY OF PRESENT ILLNESS
[de-identified] : 73 yo female is referred by Dr. 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 Patient was originally diagnosed with [id number] (4 mm, moderately differentiated) invasive ductal carcinoma of R breast, %, VA 25% in 2014.  She had lumpectomy and IORT.  She saw Dr. Wilkes and was started on anastrazole, switched to letrozole, and a third treatment she cannot recall.  She was not able to tolerate any of the medications for longer than about one month at a time.  She experienced severe insomnia, anxiety, night sweats, and unintentional weight loss.  She then stopped endocrine therapy.  She followed regularly with breast surgery and was compliant with screening mammograms.\par \par In 10/2019, screening mammogram detected R breast abnormality.  Diagnostic mammogram revealed R breast mass 6 mm 9:00 N3 (S-shape).  Biopsy revealed intermediate grade DCIS, ER %, VA %.  On 1/8/2020, patient underwent lumpectomy with Dr. 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  DCISion RT score was 3.7 (low risk).  Patient underwent re-excision on 1/27/2020 with negative margins.  She will forgo adjuvant breast radiotherapy.\par \par She now presents for discussion of adjuvant endocrine therapy. Patient indicates that secondary to severe side effects in the past, she is not interested in taking endocrine therapy.  She recovered well from surgery.

## 2020-02-23 NOTE — ASSESSMENT
[FreeTextEntry1] : 74 yof with PMH of dW7lnO2 HR-positive R-sided breast cancer s/p lumpectomy and IORT in 2014, unable to tolerate various endocrine therapies, now presents secondary to new diagnosis of HR-positive R-sided DCIS, s/p lumpectomy.\par \par Recommendation:\par - R-sided DCIS, ER and KS positive. Her DCISion RT score is 3.7 indicating low risk disease and lack of significant benefit of RT. She will forgo radiation. Regarding systemic adjuvant endocrine therapy, previously patient experienced insomnia, anxiety, night sweats, and unintentional weight loss while on letrozole and anastrozole. She is not willing to take another aromatase inhibitor.  We discussed alternative endocrine therapy with Tamoxifen.  Explained benefits and risks of tamoxifen to patient.  The side effects of tamoxifen include but at not limited to hot flashes, vasomotor symptoms, weight gain, increased thromboembolism risk and cardiovascular event, increased risk of endometrial cancer and cataract.  Patient understands benefits and side effects. She decided not to take any pill for endocrine therapy. \par - Patient aware that she may call for a follow up appointment if she wishes to try endocrine therapy or has additional questions.  She will continue to follow with breast surgery and her primary care physician.\par - Dx mammo and US every 6 months and left breast mammo annually. \par \par Patient was seen and examined with Dr. Garcia, who agreed with the above plan of care.

## 2020-02-23 NOTE — REVIEW OF SYSTEMS
[Insomnia] : insomnia [Negative] : Heme/Lymph [de-identified] : occasional insomnia is relieved with melatonin

## 2020-02-23 NOTE — CONSULT LETTER
[Dear  ___] : Dear  [unfilled], [Consult Letter:] : I had the pleasure of evaluating your patient, [unfilled]. [Please see my note below.] : Please see my note below. [Consult Closing:] : Thank you very much for allowing me to participate in the care of this patient.  If you have any questions, please do not hesitate to contact me. [Sincerely,] : Sincerely, [FreeTextEntry3] : Gloria Garcia MD

## 2020-02-26 DIAGNOSIS — D05.11 INTRADUCTAL CARCINOMA IN SITU OF RIGHT BREAST: ICD-10-CM

## 2020-03-05 NOTE — REASON FOR VISIT
[Post Op: _________] : a [unfilled] post op visit [FreeTextEntry1] : s/p Right breast re-excision - 01/27/20.

## 2020-03-05 NOTE — HISTORY OF PRESENT ILLNESS
[FreeTextEntry1] : Patient with Right breast DCIS intermediate nuclear grade, papillary on ultrasound core biopsy 7/15/14; 10:00 N6-7, 18 mm.  \par Estrogen receptor positive\par Progesterone receptor positive\par \par Right breast DCIS intermediate nuclear grade, solid and papillary on ultrasound core biopsy 7/15/14; 11:00 N6-7, 3 mm.  \par \par History of Left breast biopsy for benign calcifications.\par Her family history is significant for her niece (sister's daughter) with breast cancer at 51.\par \par s/p Right NLOC of two areas and XOFT 9/15/14 demonstrating 4 mm mod diff IDC, with negative margins; extensive DCIS, micropapillary and solid types, intermediate nuclear grade, close superior, posterior and anterior margin.   No LVI or perineural invasion.\par \par Status post Right SNB 10/27/14 demonstrating 0/1 (-).  (pT1a pN0 pMx)\par \par Patient met with Dr. Wilkes and was on AI for three months, eventually discontinued  Arimidex and letrozole due to side effects.  She was not a candidate for chemotherapy.  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 AJCC 8th Edition Pathologic Stage: pTis (DCIS), pNx, pMx.\par \par DCISionRT = 3.7\par \par s/p Right breast re-excision - 01/27/20.\par \par Pt presents to the office for her initial post-operative visit.\par She is feeling well.\par She denies any fever / chills or erythema and / or drainage related to the incision.\par Her pain is well controlled, only complains of mild soreness of the area.

## 2020-03-05 NOTE — CONSULT LETTER
[Dear  ___] : Dear  [unfilled], [Please see my note below.] : Please see my note below. [Sincerely,] : Sincerely, [FreeTextEntry2] : Alanna Tolentino M.D.\par [address] [place] [address], [address]\par [place] [id number]  [FreeTextEntry1] : This letter is in regard to our mutual patient who underwent breast mass re-excision.  Enclosed is a copy of her pathology results.\par \par We will continue to take excellent care of your patient.\par \par Please feel free to contact our office with any questions or concerns.  [FreeTextEntry3] : Qing Gardner M.D., F.A.C.S.

## 2020-03-05 NOTE — REVIEW OF SYSTEMS
[Negative] : Constitutional [Fever] : no fever [Chills] : no chills [Breast Pain] : no breast pain [Breast Lump] : no breast lump [Breast Swelling] : no breast swelling [Breast Reddening] : no reddening of the breast

## 2020-03-05 NOTE — DATA REVIEWED
[FreeTextEntry1] : Surgical Final Report\par Final Diagnosis\par 1.  Breast, right lateral margin, re-excision:\par -  Benign atrophic fatty breast tissue with focal post surgical\par site changes.  Negative for carcinoma.\par 2.  Breast, right lateral margin #2, re-excision:\par -  Benign unremarkable fibroadipose connective tissue.  Negative\par for malignancy.\par -  No glandular breast tissue is identified in this entirely\par submitted specimen.\par 3.  Breast, right superior margin, re-excision:\par -  Benign atrophic fatty breast tissue with focal post surgical\par site changes.  Negative for carcinoma.\par 4.  Breast, right superior margin #2, re-excision:\par -  Benign unremarkable atrophic fatty breast tissue.  Negative\par for carcinoma.

## 2020-03-05 NOTE — PAST MEDICAL HISTORY
[Postmenopausal] : The patient is postmenopausal [Menarche Age ____] : age at menarche was [unfilled] [Menopause Age____] : age at menopause was [unfilled] [History of Hormone Replacement Treatment] : has a history of hormone replacement treatment [Total Preg ___] : G[unfilled] [Live Births ___] : P[unfilled]  [Age At Live Birth ___] : Age at live birth: [unfilled] [FreeTextEntry5] : none [FreeTextEntry6] : none [FreeTextEntry7] : none [FreeTextEntry8] : none

## 2020-03-05 NOTE — ASSESSMENT
[FreeTextEntry1] : YADIRA presents to the office for her initial post-operative visit.\par She is status post right breast re-excision on 01/27/20.\par She is feeling well.\par She denies any fever / chills or erythema and / or drainage related to the incision.\par Her pain is well controlled.\par Her sutures were removed and pathology was discussed.\par \par Plan:\par 1. Pt will be referred to medical oncology for consultation.\par 2. Pt will return for follow-up and clinical breast examination in three months.\par 3. She will need a right unilateral diagnostic mammogram and sonogram in July 2020.\par \par I spent a total of 15 minutes of face to face time with this patient, greater than 50% of which was spent in counseling and/or coordination of care.\par All of her questions were appropriately answered.\par She knows to call with any concerns.\par

## 2020-08-19 ENCOUNTER — APPOINTMENT (OUTPATIENT)
Dept: BREAST CENTER | Facility: CLINIC | Age: 75
End: 2020-08-19
Payer: MEDICARE

## 2020-08-19 VITALS
TEMPERATURE: 97.8 F | SYSTOLIC BLOOD PRESSURE: 140 MMHG | HEIGHT: 62 IN | BODY MASS INDEX: 53.92 KG/M2 | WEIGHT: 293 LBS | DIASTOLIC BLOOD PRESSURE: 78 MMHG

## 2020-08-19 PROCEDURE — 99213 OFFICE O/P EST LOW 20 MIN: CPT

## 2020-08-19 NOTE — PAST MEDICAL HISTORY
[Menopause Age____] : age at menopause was [unfilled] [Postmenopausal] : The patient is postmenopausal [Menarche Age ____] : age at menarche was [unfilled] [History of Hormone Replacement Treatment] : has a history of hormone replacement treatment [Total Preg ___] : G[unfilled] [Live Births ___] : P[unfilled]  [Age At Live Birth ___] : Age at live birth: [unfilled] [FreeTextEntry5] : none [FreeTextEntry6] : none [FreeTextEntry7] : none [FreeTextEntry8] : none

## 2020-08-19 NOTE — PHYSICAL EXAM
[Atraumatic] : atraumatic [No Supraclavicular Adenopathy] : no supraclavicular adenopathy [Normocephalic] : normocephalic [Examined in the supine and seated position] : examined in the supine and seated position [No dominant masses] : no dominant masses in right breast  [No Nipple Discharge] : no right nipple discharge [No dominant masses] : no dominant masses left breast [No Rashes] : no rashes [No Ulceration] : no ulceration [No Axillary Lymphadenopathy] : no left axillary lymphadenopathy [Breast Nipple Inversion] : nipples not inverted [Breast Nipple Retraction] : nipples not retracted [de-identified] : well healed incisions.\par palpable dense scar tissue.

## 2020-08-19 NOTE — DATA REVIEWED
[FreeTextEntry1] : OVERALL IMPRESSION:\par Postsurgical changes in the right breast. Continued surveillance of the lumpectomy site every 6 months is advised until the patient is at least 2 years postoperative.\par There is no mammographic or sonographic evidence of malignancy.\par \par A 6 month follow-up of both breast(s) is recommended. A letter will be sent to the patient to return for follow up.\par \par The findings and recommendations were communicated to the patient.\par \par BI-RADS 2: BENIGN\par \par MAMMO TOMOSYNTHESIS DIAGNOSTIC RIGHT, US BREAST\par \par Clinical Breast Exam: Patient does report clinical breast exam in the last year.\par \par Clinical Indication: No family history of breast cancer. Patient is status post lumpectomy on the right breast.\par \par Compared to: 12/03/2019, 10/23/2019, 10/09/2018, 10/05/2017, 05/05/2017, 09/27/2016, 03/03/2016, 09/04/2015, 03/13/2015, 07/15/2014, and 07/09/2014\par \par MAMMOGRAM:\par \par Tomosynthesis and 2D imaging of the breast(s) were performed. Current study was also evaluated with a computer aided detection (CAD) system.\par \par Breast Density: Heterogeneously dense, which may obscure small masses.\par \par There are postsurgical changes in the right breast. No significant masses, calcifications, or other findings are seen in the right breast.\par \par US BREAST COMPLETE RIGHT\par \par Ultrasound evaluation was performed including examination of all four quadrants of the breast(s) and the retroareolar regions.\par \par \par Color flow, Gray scale and real-time ultrasound of the right breast was performed.\par \par An area of postsurgical scarring is noted in the upper outer quadrant of the right breast. No suspicious abnormalities were seen sonographically in the right breast.\par \par Electronic Signature: I personally reviewed the images and agree with this report. Final Report: Dictated by and Signed by Attending Danica Mccartney MD 7/20/2020 11:35 AM\par \par

## 2020-08-19 NOTE — REASON FOR VISIT
[Follow-Up: _____] : a [unfilled] follow-up visit [FreeTextEntry1] : h/o right breast cancer (2014; 2019); imaging review.

## 2020-08-19 NOTE — HISTORY OF PRESENT ILLNESS
[FreeTextEntry1] : Patient with Right breast DCIS intermediate nuclear grade, papillary on ultrasound core biopsy 7/15/14; 10:00 N6-7, 18 mm.  \par Estrogen receptor positive\par Progesterone receptor positive\par \par Right breast DCIS intermediate nuclear grade, solid and papillary on ultrasound core biopsy 7/15/14; 11:00 N6-7, 3 mm.  \par \par History of Left breast biopsy for benign calcifications.\par Her family history is significant for her niece (sister's daughter) with breast cancer at 51.\par \par s/p Right NLOC of two areas and XOFT 9/15/14 demonstrating 4 mm mod diff IDC, with negative margins; extensive DCIS, micropapillary and solid types, intermediate nuclear grade, close superior, posterior and anterior margin.   No LVI or perineural invasion.\par \par Status post Right SNB 10/27/14 demonstrating 0/1 (-).  (pT1a pN0 pMx)\par \par Patient met with Dr. Wilkes and was on AI for three months, eventually discontinued  Arimidex and letrozole due to side effects.  She was not a candidate for chemotherapy.  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 AJCC 8th Edition Pathologic Stage: pTis (DCIS), pNx, pMx.\par \par Pt met w/ Dr. Garcia - she has declined adjuvant hormonal tx.\par \par YADIRA MUNIZ is a 74 year old female patient who presents today in follow up for history of right breast cancer (2014; 2019).\par Since her last visit, she has no new breast related complaints. \par Imaging of the right breast was done on 07/20/2020, which revealed postsurgical changes in the right breast.  There is no mammographic or sonographic evidence of malignancy.\par \par She presents today for evaluation and imaging review.

## 2020-08-19 NOTE — ASSESSMENT
[FreeTextEntry1] : YADIRA is a herrera 74 year old patient who presented today in follow up for a history of right breast cancer \par (2014; 2019).  \par She has been doing well with no new breast related complaints. \par Imaging of the right breast was done recently which revealed postsurgical changes in the right breast.  There is no mammographic or sonographic evidence of malignancy, as detailed above. \par Physical exam was unrevealing today.\par \par Imaging with a bilateral diagnostic mammogram and sonogram will be due in January 2021, and that will be scheduled today. \par She will return for follow-up and clinical breast exam in six months.\par \par I spent a total of 15 minutes of face to face time with this patient, greater than 50% of which was spent in counseling and/or coordination of care.\par All of her questions were appropriately answered.\par She knows to call with any concerns.

## 2020-12-18 NOTE — ASU PATIENT PROFILE, ADULT - VISION (WITH CORRECTIVE LENSES IF THE PATIENT USUALLY WEARS THEM):
Normal vision: sees adequately in most situations; can see medication labels, newsprint Terbinafine Counseling: Patient counseling regarding adverse effects of terbinafine including but not limited to headache, diarrhea, rash, upset stomach, liver function test abnormalities, itching, taste/smell disturbance, nausea, abdominal pain, and flatulence.  There is a rare possibility of liver failure that can occur when taking terbinafine.  The patient understands that a baseline LFT and kidney function test may be required. The patient verbalized understanding of the proper use and possible adverse effects of terbinafine.  All of the patient's questions and concerns were addressed.

## 2021-01-22 ENCOUNTER — NON-APPOINTMENT (OUTPATIENT)
Age: 76
End: 2021-01-22

## 2021-02-19 ENCOUNTER — APPOINTMENT (OUTPATIENT)
Dept: BREAST CENTER | Facility: CLINIC | Age: 76
End: 2021-02-19

## 2021-03-11 ENCOUNTER — APPOINTMENT (OUTPATIENT)
Dept: BREAST CENTER | Facility: CLINIC | Age: 76
End: 2021-03-11
Payer: MEDICARE

## 2021-03-11 VITALS
HEIGHT: 62 IN | WEIGHT: 125 LBS | BODY MASS INDEX: 23 KG/M2 | DIASTOLIC BLOOD PRESSURE: 80 MMHG | SYSTOLIC BLOOD PRESSURE: 132 MMHG | TEMPERATURE: 97.2 F

## 2021-03-11 PROCEDURE — 99215 OFFICE O/P EST HI 40 MIN: CPT

## 2021-03-11 PROCEDURE — 93702 BIS XTRACELL FLUID ANALYSIS: CPT

## 2021-03-11 NOTE — HISTORY OF PRESENT ILLNESS
[FreeTextEntry1] : Patient with Right breast DCIS intermediate nuclear grade, papillary on ultrasound core biopsy 7/15/14; 10:00 N6-7, 18 mm.  \par Estrogen receptor positive\par Progesterone receptor positive\par \par Right breast DCIS intermediate nuclear grade, solid and papillary on ultrasound core biopsy 7/15/14; 11:00 N6-7, 3 mm.  \par \par History of Left breast biopsy for benign calcifications.\par Her family history is significant for her niece (sister's daughter) with breast cancer at 51.\par \par s/p Right NLOC of two areas and XOFT 9/15/14 demonstrating 4 mm mod diff IDC, with negative margins; extensive DCIS, micropapillary and solid types, intermediate nuclear grade, close superior, posterior and anterior margin.   No LVI or perineural invasion.\par \par Status post Right SNB 10/27/14 demonstrating 0/1 (-).  (pT1a pN0 pMx)\par \par Patient met with Dr. Wilkes and was on AI for three months, eventually discontinued  Arimidex and letrozole due to side effects.  She was not a candidate for chemotherapy.  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 AJCC 8th Edition Pathologic Stage: pTis (DCIS), pNx, pMx.\par \par Pt met w/ Dr. Garcia - (Feb 2020) she has declined adjuvant hormonal tx.\par \par s/p US Guided Core Bx - 02/16/2021:\par Right, 9:00 N3, 8mm: (hourglass)\par -Invasive ductal carcinoma with papillary features, moderately differentiated, associated with focal necrosis and microcalcifications.\par -Foreign body giant cell reaction.\par ER  (+)  %\par MI  (+)  41-50%\par HER2  (+)  by FISH.\par Ki-67   40%

## 2021-03-11 NOTE — REASON FOR VISIT
[Follow-Up: _____] : a [unfilled] follow-up visit [FreeTextEntry1] : h/o right breast cancer (2014; 2019); surgical consultation.

## 2021-03-11 NOTE — ASSESSMENT
[FreeTextEntry1] : YADIAR is a herrera 75 year old patient who presented today for surgical consultation. \par She has a history of right breast cancer (2014; 2019).\par She is status post ultrasound guided core biopsy of the right breast on 02/16/2021.\par Pathology revealed:\par -Invasive ductal carcinoma with papillary features, moderately differentiated, associated with focal necrosis and microcalcifications.\par -Foreign body giant cell reaction.\par ER  (+)  %\par IL  (+)  41-50%\par HER2  (+)  by FISH.\par Ki-67   40%\par \par On physical exam, there are no discrete masses in either breast or axilla.  There is no nipple discharge or inversion bilaterally.  There are no skin changes bilaterally.  \par \par We had a lengthy discussion regarding her diagnosis and treatment options.  Her pathology results were reviewed.  The patient will need a bilateral breast MRI w/wo contrast performed preoperatively.  This is for evaluation of extent of disease in the affected breast and to rule out disease in the contralateral breast.\par \par Mastectomy techniques were discussed.  In addition to the usual risks of mastectomy, nipple-sparing mastectomy carries potential additional risks of cancer recurrence/occurrence.  Although studied outcomes have been very good to date, there is limited long-term data available regarding recurrence/occurrence rates.  The risk of nipple loss and high likelihood of loss of nipple sensation were discussed.  When performing a nipple-sparing mastectomy, the subareolar tissue would be examined pathologically and if any cancer or abnormal cells are found, a subsequent nipple excision would be recommended.  Reconstructive options were discussed with the risks and benefits to each.  Referral to Dr. Carline Grimes, reconstructive plastic surgeon was offered but she is declining reconstruction at this time.\par \par Regardless if she chooses lumpectomy or mastectomy, she will require evaluation of her axillary lymph nodes via sentinel lymph node biopsy since her disease is invasive.  Initial SOZO measurements were taken today.  Kennett node biopsy is done by injecting the periareolar breast tissue with a radioactive tracer one day prior to her surgery and removing the first few lymph nodes that drain the breast.  If the sentinel lymph node is found to have metastatic disease either on the intraoperative evaluation or on final pathology, an axillary dissection may be performed/recommended.  Risks of axillary node dissection, including lymphedema, were discussed.  There is evidence that it may not be necessary to complete an axillary node dissection if one or two sentinel lymph nodes are positive and treated by lumpectomy and conventional tangential field radiation.  \par \par We discussed that there is no difference in survival rate between lumpectomy with radiation therapy versus a mastectomy.  However, the rate of local recurrence is slightly higher with lumpectomy.  The rate of recurrence with mastectomy is not zero, and is likely closer to 4-9%.    \par \par The benefits and risks of the mastectomy procedure were explained to the patient, including but not limited to bleeding, infection, seroma and/or hematoma formation, pain, numbness of chest wall/underarm, inner side of upper arm, scarring, issues with wound healing, lymphedema, nerve injury, and the possibility of leaving breast cells behind.  Informed consent was obtained today.  She is aware that she will meet with the pre-surgical department here at the [facility] for pre-surgery testing.  She is also aware that she will likely need to meet with her primary care physician, and/or other medical specialists to obtain clearance for surgery, and to contact them appropriately.  \par \par With regard to systemic therapy, the general indications for the use of chemotherapy were discussed, but is dependent on final pathology results.  She will be referred to medical oncology post-operatively for discussion.  \par \par PLAN:\par 1. Bilateral Breast MRI w/wo contrast.\par 2. Right simple mastectomy, right sentinel lymph node mapping and biopsy with possible axillary node dissection.\par 3. Patient will be referred back to Dr. Garcia in Medical Oncology post operatively.\par \par I spent a total of 45 minutes of face to face time with this patient, greater than 50% of which was spent in counseling and/or coordination of care.  All of her questions were appropriately answered.\par

## 2021-03-11 NOTE — PHYSICAL EXAM
[Normocephalic] : normocephalic [Atraumatic] : atraumatic [No Supraclavicular Adenopathy] : no supraclavicular adenopathy [No Cervical Adenopathy] : no cervical adenopathy [Examined in the supine and seated position] : examined in the supine and seated position [No dominant masses] : no dominant masses in right breast  [No dominant masses] : no dominant masses left breast [No Nipple Discharge] : no left nipple discharge [No Axillary Lymphadenopathy] : no left axillary lymphadenopathy [No Rashes] : no rashes [No Ulceration] : no ulceration [Breast Nipple Inversion] : nipples not inverted [Breast Nipple Retraction] : nipples not retracted [de-identified] : well healed surgical scars. [de-identified] : Right L-Dex Score: -12.0 (03/11/2021)

## 2021-03-11 NOTE — DATA REVIEWED
[FreeTextEntry1] : B/L Dx Mammo & Sono - 01/22/2021:\par MAMMOGRAM: \par  \par Tomosynthesis and 2D imaging of the breast(s) were performed.  Current study was also evaluated with a computer aided detection (CAD) system.\par  \par Breast Density: Heterogeneously dense, which may obscure small masses.\par  \par Postsurgical changes are identified in the right breast. No significant masses, calcifications, or other findings are seen in either breast.\par  \par US BREAST COMPLETE BILATERAL\par  \par Ultrasound evaluation was performed including examination of all four quadrants of the breast(s) and the retroareolar regions.\par  \par Color flow, Gray scale and real-time ultrasound of both breasts was performed. \par  \par There are postsurgical changes noted throughout the left breast. \par  \par There are multiple hypoechoic lesions throughout the right breast as follows likely representing postsurgical changes/fat necrosis: \par 1:00 axis at 3 cm from nipple measuring 8 x 10 x 8 mm. A six-month follow-up targeted breast ultrasound is advised.\par  \par 9:00 axis at 4 cm from nipple measuring 3 x 2 x 4 mm. Six-month follow-up targeted breast ultrasound is advised.\par  \par 10:00 axis at 1 cm from nipple measuring 7 x 3 x 4 mm. Six-month follow-up targeted breast ultrasound is advised. \par  \par There are 3 adjacent hypoechoic lesions in the right breast at 9:00 axis at 2 cm from nipple measuring 2-3 mm in size. Six-month follow-up breast ultrasound is advised.\par  \par There is a hypoechoic lesion in the right breast at 9:00 axis at 3 cm from nipple measuring 8 x 3 x 6 mm. Ultrasound-guided core biopsy is recommended.\par There is a hypoechoic lesion in the right breast at 8:00 axis at 4 cm from nipple measuring 3 x 3 x 7 mm. Ultrasound-guided core biopsy is advised.\par  \par There is an area of postsurgical scarring in the upper outer quadrant of the right breast.\par  \par In the left breast, there are 2 adjacent hypoechoic lesions identified at 2:00 axis at 4 cm from nipple measuring 6 x 4 x 6 mm and at 2:00 axis at 3 cm from the nipple measuring 4 x 2 x 4 mm. These likely represent intramammary lymph nodes and can be reevaluated sonographically in 6 months.\par  \par OVERALL IMPRESSION: \par  \par Multiple hypoechoic lesions in the right breast likely representing postsurgical changes and fat necrosis. Two of the lesions are recommended for biopsy. The remainder can be reevaluated sonographically in 6 months.\par  \par Probable intramammary lymph nodes in the left breast. A six-month follow-up targeted breast ultrasound is advised.\par  \par Biopsy of the right breast(s) is recommended. A letter will be sent to the patient to return for a biopsy.\par  \par The findings and recommendations were discussed with the patient.\par  \par BI-RADS 4: SUSPICIOUS\par \par \par US Guided Core Bx - 02/16/2021:\par Right, 9:00 N3, 8mm: (hourglass)\par -Invasive ductal carcinoma with papillary features, moderately differentiated, associated with focal necrosis and microcalcifications.\par -Foreign body giant cell reaction.\par ER  (+)  %\par CO  (+)  41-50%\par HER2  (+)  by FISH.\par Ki-67   40%\par \par Right, 8:00, N4, 7mm: (cork)\par -Benign fibroadipose tissue.

## 2021-03-26 ENCOUNTER — RESULT REVIEW (OUTPATIENT)
Age: 76
End: 2021-03-26

## 2021-03-26 ENCOUNTER — OUTPATIENT (OUTPATIENT)
Dept: OUTPATIENT SERVICES | Facility: HOSPITAL | Age: 76
LOS: 1 days | Discharge: HOME | End: 2021-03-26
Payer: MEDICARE

## 2021-03-26 DIAGNOSIS — Z98.890 OTHER SPECIFIED POSTPROCEDURAL STATES: Chronic | ICD-10-CM

## 2021-03-26 PROCEDURE — 77049 MRI BREAST C-+ W/CAD BI: CPT | Mod: 26

## 2021-03-30 ENCOUNTER — LABORATORY RESULT (OUTPATIENT)
Age: 76
End: 2021-03-30

## 2021-03-31 ENCOUNTER — NON-APPOINTMENT (OUTPATIENT)
Age: 76
End: 2021-03-31

## 2021-04-01 ENCOUNTER — NON-APPOINTMENT (OUTPATIENT)
Age: 76
End: 2021-04-01

## 2021-04-07 ENCOUNTER — RESULT REVIEW (OUTPATIENT)
Age: 76
End: 2021-04-07

## 2021-04-07 ENCOUNTER — OUTPATIENT (OUTPATIENT)
Dept: OUTPATIENT SERVICES | Facility: HOSPITAL | Age: 76
LOS: 1 days | Discharge: HOME | End: 2021-04-07
Payer: MEDICARE

## 2021-04-07 VITALS
HEART RATE: 78 BPM | SYSTOLIC BLOOD PRESSURE: 160 MMHG | RESPIRATION RATE: 16 BRPM | WEIGHT: 119.93 LBS | DIASTOLIC BLOOD PRESSURE: 80 MMHG | TEMPERATURE: 98 F | OXYGEN SATURATION: 100 % | HEIGHT: 62 IN

## 2021-04-07 DIAGNOSIS — Z01.818 ENCOUNTER FOR OTHER PREPROCEDURAL EXAMINATION: ICD-10-CM

## 2021-04-07 DIAGNOSIS — Z98.890 OTHER SPECIFIED POSTPROCEDURAL STATES: Chronic | ICD-10-CM

## 2021-04-07 DIAGNOSIS — C50.411 MALIGNANT NEOPLASM OF UPPER-OUTER QUADRANT OF RIGHT FEMALE BREAST: ICD-10-CM

## 2021-04-07 LAB
ALBUMIN SERPL ELPH-MCNC: 4.4 G/DL — SIGNIFICANT CHANGE UP (ref 3.5–5.2)
ALP SERPL-CCNC: 82 U/L — SIGNIFICANT CHANGE UP (ref 30–115)
ALT FLD-CCNC: 18 U/L — SIGNIFICANT CHANGE UP (ref 0–41)
ANION GAP SERPL CALC-SCNC: 9 MMOL/L — SIGNIFICANT CHANGE UP (ref 7–14)
APTT BLD: 34.9 SEC — SIGNIFICANT CHANGE UP (ref 27–39.2)
AST SERPL-CCNC: 23 U/L — SIGNIFICANT CHANGE UP (ref 0–41)
BASOPHILS # BLD AUTO: 0.14 K/UL — SIGNIFICANT CHANGE UP (ref 0–0.2)
BASOPHILS NFR BLD AUTO: 1.7 % — HIGH (ref 0–1)
BILIRUB SERPL-MCNC: 0.4 MG/DL — SIGNIFICANT CHANGE UP (ref 0.2–1.2)
BUN SERPL-MCNC: 15 MG/DL — SIGNIFICANT CHANGE UP (ref 10–20)
CALCIUM SERPL-MCNC: 10.1 MG/DL — SIGNIFICANT CHANGE UP (ref 8.5–10.1)
CHLORIDE SERPL-SCNC: 99 MMOL/L — SIGNIFICANT CHANGE UP (ref 98–110)
CO2 SERPL-SCNC: 27 MMOL/L — SIGNIFICANT CHANGE UP (ref 17–32)
CREAT SERPL-MCNC: 0.7 MG/DL — SIGNIFICANT CHANGE UP (ref 0.7–1.5)
EOSINOPHIL # BLD AUTO: 0.12 K/UL — SIGNIFICANT CHANGE UP (ref 0–0.7)
EOSINOPHIL NFR BLD AUTO: 1.5 % — SIGNIFICANT CHANGE UP (ref 0–8)
GLUCOSE SERPL-MCNC: 95 MG/DL — SIGNIFICANT CHANGE UP (ref 70–99)
HCT VFR BLD CALC: 39.4 % — SIGNIFICANT CHANGE UP (ref 37–47)
HGB BLD-MCNC: 13 G/DL — SIGNIFICANT CHANGE UP (ref 12–16)
IMM GRANULOCYTES NFR BLD AUTO: 0.4 % — HIGH (ref 0.1–0.3)
INR BLD: 1.04 RATIO — SIGNIFICANT CHANGE UP (ref 0.65–1.3)
LYMPHOCYTES # BLD AUTO: 1.79 K/UL — SIGNIFICANT CHANGE UP (ref 1.2–3.4)
LYMPHOCYTES # BLD AUTO: 22.3 % — SIGNIFICANT CHANGE UP (ref 20.5–51.1)
MCHC RBC-ENTMCNC: 32 PG — HIGH (ref 27–31)
MCHC RBC-ENTMCNC: 33 G/DL — SIGNIFICANT CHANGE UP (ref 32–37)
MCV RBC AUTO: 97 FL — SIGNIFICANT CHANGE UP (ref 81–99)
MONOCYTES # BLD AUTO: 0.58 K/UL — SIGNIFICANT CHANGE UP (ref 0.1–0.6)
MONOCYTES NFR BLD AUTO: 7.2 % — SIGNIFICANT CHANGE UP (ref 1.7–9.3)
NEUTROPHILS # BLD AUTO: 5.36 K/UL — SIGNIFICANT CHANGE UP (ref 1.4–6.5)
NEUTROPHILS NFR BLD AUTO: 66.9 % — SIGNIFICANT CHANGE UP (ref 42.2–75.2)
NRBC # BLD: 0 /100 WBCS — SIGNIFICANT CHANGE UP (ref 0–0)
PLATELET # BLD AUTO: 394 K/UL — SIGNIFICANT CHANGE UP (ref 130–400)
POTASSIUM SERPL-MCNC: 5.2 MMOL/L — HIGH (ref 3.5–5)
POTASSIUM SERPL-SCNC: 5.2 MMOL/L — HIGH (ref 3.5–5)
PROT SERPL-MCNC: 7.3 G/DL — SIGNIFICANT CHANGE UP (ref 6–8)
PROTHROM AB SERPL-ACNC: 12 SEC — SIGNIFICANT CHANGE UP (ref 9.95–12.87)
RBC # BLD: 4.06 M/UL — LOW (ref 4.2–5.4)
RBC # FLD: 12.8 % — SIGNIFICANT CHANGE UP (ref 11.5–14.5)
SODIUM SERPL-SCNC: 135 MMOL/L — SIGNIFICANT CHANGE UP (ref 135–146)
WBC # BLD: 8.02 K/UL — SIGNIFICANT CHANGE UP (ref 4.8–10.8)
WBC # FLD AUTO: 8.02 K/UL — SIGNIFICANT CHANGE UP (ref 4.8–10.8)

## 2021-04-07 PROCEDURE — 93010 ELECTROCARDIOGRAM REPORT: CPT

## 2021-04-07 PROCEDURE — 71046 X-RAY EXAM CHEST 2 VIEWS: CPT | Mod: 26

## 2021-04-07 RX ORDER — AMLODIPINE BESYLATE 2.5 MG/1
1 TABLET ORAL
Qty: 0 | Refills: 0 | DISCHARGE

## 2021-04-07 NOTE — H&P PST ADULT - HISTORY OF PRESENT ILLNESS
Pt states in 2/2021, she went in for her routine 6 month mammogram when an abnormal area was noted. Biopsy later confirmed breast CA in right breast. Pt denies any abnormal mass, breast changes, or unusual symptoms. Denies any chest pain, difficulty breathing, SOB, palpitations, dysuria, URI, or any other infections in the last 2 weeks. Denies any recent travel, contact, or exposure to any persons with known or suspected COVID-19. Pt also denies COVID testing within the last 2 weeks. Denies any suicidal or homicidal ideations. Pt advised to self quarantine until day of procedure. Exercise tolerance of 2-3 flights of stairs without dyspnea; pt states she also walks 3 miles daily. HANNAH reviewed with patient. Pt verbalized understanding of all pre-operative instructions.    Anesthesia Alert  NO--Difficult Airway  NO--History of neck surgery or radiation  NO--Limited ROM of neck  NO--History of Malignant hyperthermia  NO--No personal or family history of Pseudocholinesterase deficiency.  NO--Prior Anesthesia Complication  NO--Latex Allergy  NO--Loose teeth  NO--History of Rheumatoid Arthritis  NO--HANNAH  NO--Other_____

## 2021-04-07 NOTE — H&P PST ADULT - NSICDXPASTMEDICALHX_GEN_ALL_CORE_FT
PAST MEDICAL HISTORY:  Breast cancer RT-2014 RAD RX    Heart murmur during childhood    HTN (hypertension)

## 2021-04-07 NOTE — H&P PST ADULT - MALLAMPATI CLASS
never used smokeless tobacco. He reports that he does not drink alcohol or use drugs. PHYSICAL EXAM     INITIAL VITALS:  weight is 12.6 kg. His tympanic temperature is 99.4 °F (37.4 °C). His pulse is 128. His respiration is 36 (abnormal). Physical Exam  Vitals signs reviewed. Constitutional:       General: He is active. He is not in acute distress. Appearance: He is well-developed. HENT:      Head:      Comments: Tympanic membrane's are bilaterally erythematous with loss of typical landmarks. Nose: Congestion and rhinorrhea present. Mouth/Throat:      Pharynx: Oropharynx is clear. No oropharyngeal exudate or posterior oropharyngeal erythema. Eyes:      Conjunctiva/sclera: Conjunctivae normal.      Pupils: Pupils are equal, round, and reactive to light. Neck:      Musculoskeletal: Normal range of motion and neck supple. Cardiovascular:      Rate and Rhythm: Normal rate and regular rhythm. Pulmonary:      Effort: Pulmonary effort is normal. No respiratory distress. Breath sounds: Normal breath sounds. Abdominal:      General: There is no distension. Palpations: Abdomen is soft. Tenderness: There is no tenderness. Musculoskeletal: Normal range of motion. General: No tenderness. Skin:     General: Skin is warm and dry. Findings: No rash. Neurological:      Mental Status: He is alert. MDM:   Patient will be evaluated for influenza and RSV. He here is very playful and is not toxic appearing. In fact he has his mother's phone playing music in his hands and he is dancing on the bed. Results for orders placed or performed during the hospital encounter of 01/11/20   Rapid Influenza A/B Antigens   Result Value Ref Range    Specimen Description . NASOPHARYNGEAL SWAB     Special Requests NOT REPORTED     Direct Exam POSITIVE for Influenza B Antigen (A)     Direct Exam NEGATIVE for Influenza A Antigen    Rapid RSV Antigen   Result Value Ref Range Class II - visualization of the soft palate, fauces, and uvula

## 2021-04-07 NOTE — H&P PST ADULT - REASON FOR ADMISSION
76 yo female presents for PAST in preparation for right breast simple mastectomy right sentinel node mapping and biopsy possible axillary node dissection on 4/28/2021 under general anesthesia by Dr. Gardner ([facility]).

## 2021-04-07 NOTE — H&P PST ADULT - NS PRO REGISTERED ORGAN DONOR
Dr. Thompson believes issues reported are somatic symptoms related to untreated anxiety. Stated not sure if hallucinations are from the meds. And stated not everyone gains weight from Remeron. For patient to continue the medication and will discuss next week. Writer placed call to patient to discuss Dr. Gomez response. Discussed all Dr. Thompson said. Discussed more about medication Remeron, and told to write down any side effects she may experience over weekend, so she can discuss them with Dr. Thompson. No further questions or concerns at this time.    No

## 2021-04-08 DIAGNOSIS — D05.11 INTRADUCTAL CARCINOMA IN SITU OF RIGHT BREAST: ICD-10-CM

## 2021-04-09 ENCOUNTER — NON-APPOINTMENT (OUTPATIENT)
Age: 76
End: 2021-04-09

## 2021-04-21 PROBLEM — R01.1 CARDIAC MURMUR, UNSPECIFIED: Chronic | Status: ACTIVE | Noted: 2020-01-02

## 2021-04-25 ENCOUNTER — LABORATORY RESULT (OUTPATIENT)
Age: 76
End: 2021-04-25

## 2021-04-25 ENCOUNTER — OUTPATIENT (OUTPATIENT)
Dept: OUTPATIENT SERVICES | Facility: HOSPITAL | Age: 76
LOS: 1 days | Discharge: HOME | End: 2021-04-25

## 2021-04-25 DIAGNOSIS — Z11.59 ENCOUNTER FOR SCREENING FOR OTHER VIRAL DISEASES: ICD-10-CM

## 2021-04-25 DIAGNOSIS — Z98.890 OTHER SPECIFIED POSTPROCEDURAL STATES: Chronic | ICD-10-CM

## 2021-04-27 ENCOUNTER — NON-APPOINTMENT (OUTPATIENT)
Age: 76
End: 2021-04-27

## 2021-04-27 ENCOUNTER — OUTPATIENT (OUTPATIENT)
Dept: OUTPATIENT SERVICES | Facility: HOSPITAL | Age: 76
LOS: 1 days | Discharge: HOME | End: 2021-04-27
Payer: MEDICARE

## 2021-04-27 ENCOUNTER — RESULT REVIEW (OUTPATIENT)
Age: 76
End: 2021-04-27

## 2021-04-27 DIAGNOSIS — Z98.890 OTHER SPECIFIED POSTPROCEDURAL STATES: Chronic | ICD-10-CM

## 2021-04-27 DIAGNOSIS — C50.919 MALIGNANT NEOPLASM OF UNSPECIFIED SITE OF UNSPECIFIED FEMALE BREAST: ICD-10-CM

## 2021-04-27 PROCEDURE — 78195 LYMPH SYSTEM IMAGING: CPT | Mod: 26,MH

## 2021-04-28 ENCOUNTER — RESULT REVIEW (OUTPATIENT)
Age: 76
End: 2021-04-28

## 2021-04-28 ENCOUNTER — OUTPATIENT (OUTPATIENT)
Dept: OUTPATIENT SERVICES | Facility: HOSPITAL | Age: 76
LOS: 1 days | Discharge: HOME | End: 2021-04-28
Payer: MEDICARE

## 2021-04-28 ENCOUNTER — APPOINTMENT (OUTPATIENT)
Dept: BREAST CENTER | Facility: AMBULATORY SURGERY CENTER | Age: 76
End: 2021-04-28
Payer: MEDICARE

## 2021-04-28 VITALS
DIASTOLIC BLOOD PRESSURE: 67 MMHG | RESPIRATION RATE: 20 BRPM | SYSTOLIC BLOOD PRESSURE: 133 MMHG | HEART RATE: 70 BPM | OXYGEN SATURATION: 97 %

## 2021-04-28 VITALS
HEIGHT: 62 IN | SYSTOLIC BLOOD PRESSURE: 164 MMHG | HEART RATE: 69 BPM | DIASTOLIC BLOOD PRESSURE: 74 MMHG | TEMPERATURE: 98 F | WEIGHT: 119.93 LBS | OXYGEN SATURATION: 99 % | RESPIRATION RATE: 18 BRPM

## 2021-04-28 DIAGNOSIS — Z98.890 OTHER SPECIFIED POSTPROCEDURAL STATES: Chronic | ICD-10-CM

## 2021-04-28 PROCEDURE — 88305 TISSUE EXAM BY PATHOLOGIST: CPT | Mod: 26

## 2021-04-28 PROCEDURE — 88360 TUMOR IMMUNOHISTOCHEM/MANUAL: CPT | Mod: 26,59

## 2021-04-28 PROCEDURE — 88341 IMHCHEM/IMCYTCHM EA ADD ANTB: CPT | Mod: 26,59

## 2021-04-28 PROCEDURE — 38525 BIOPSY/REMOVAL LYMPH NODES: CPT | Mod: RT

## 2021-04-28 PROCEDURE — 38900 IO MAP OF SENT LYMPH NODE: CPT | Mod: RT

## 2021-04-28 PROCEDURE — 88307 TISSUE EXAM BY PATHOLOGIST: CPT | Mod: 26

## 2021-04-28 PROCEDURE — 19301 PARTIAL MASTECTOMY: CPT | Mod: RT

## 2021-04-28 PROCEDURE — 88360 TUMOR IMMUNOHISTOCHEM/MANUAL: CPT | Mod: 26

## 2021-04-28 PROCEDURE — 88342 IMHCHEM/IMCYTCHM 1ST ANTB: CPT | Mod: 26,59

## 2021-04-28 RX ORDER — FOLIC ACID 0.8 MG
1 TABLET ORAL
Qty: 0 | Refills: 0 | DISCHARGE

## 2021-04-28 RX ORDER — OXYCODONE AND ACETAMINOPHEN 5; 325 MG/1; MG/1
1 TABLET ORAL EVERY 4 HOURS
Refills: 0 | Status: DISCONTINUED | OUTPATIENT
Start: 2021-04-28 | End: 2021-04-28

## 2021-04-28 RX ORDER — SODIUM CHLORIDE 9 MG/ML
1000 INJECTION, SOLUTION INTRAVENOUS
Refills: 0 | Status: DISCONTINUED | OUTPATIENT
Start: 2021-04-28 | End: 2021-05-12

## 2021-04-28 RX ORDER — AMLODIPINE BESYLATE 2.5 MG/1
1 TABLET ORAL
Qty: 0 | Refills: 0 | DISCHARGE

## 2021-04-28 RX ORDER — ONDANSETRON 8 MG/1
4 TABLET, FILM COATED ORAL ONCE
Refills: 0 | Status: COMPLETED | OUTPATIENT
Start: 2021-04-28 | End: 2021-04-28

## 2021-04-28 RX ORDER — HYDROMORPHONE HYDROCHLORIDE 2 MG/ML
0.5 INJECTION INTRAMUSCULAR; INTRAVENOUS; SUBCUTANEOUS
Refills: 0 | Status: DISCONTINUED | OUTPATIENT
Start: 2021-04-28 | End: 2021-04-28

## 2021-04-28 RX ORDER — HYDROMORPHONE HYDROCHLORIDE 2 MG/ML
1 INJECTION INTRAMUSCULAR; INTRAVENOUS; SUBCUTANEOUS
Refills: 0 | Status: DISCONTINUED | OUTPATIENT
Start: 2021-04-28 | End: 2021-04-28

## 2021-04-28 RX ADMIN — SODIUM CHLORIDE 100 MILLILITER(S): 9 INJECTION, SOLUTION INTRAVENOUS at 10:35

## 2021-04-28 RX ADMIN — HYDROMORPHONE HYDROCHLORIDE 0.5 MILLIGRAM(S): 2 INJECTION INTRAMUSCULAR; INTRAVENOUS; SUBCUTANEOUS at 11:27

## 2021-04-28 RX ADMIN — ONDANSETRON 4 MILLIGRAM(S): 8 TABLET, FILM COATED ORAL at 10:34

## 2021-04-28 NOTE — ASU DISCHARGE PLAN (ADULT/PEDIATRIC) - FOLLOW UP APPOINTMENTS
[facility]:  [facility] [phone]/[facility]:  [facility]

## 2021-04-28 NOTE — BRIEF OPERATIVE NOTE - NSICDXBRIEFPROCEDURE_GEN_ALL_CORE_FT
PROCEDURES:  Mastectomy, simple, with sentinel lymph node dissection 28-Apr-2021 10:06:27  Qing Gardner

## 2021-04-28 NOTE — CHART NOTE - NSCHARTNOTEFT_GEN_A_CORE
PACU ANESTHESIA ADMISSION NOTE      Procedure: Mastectomy, simple, with sentinel lymph node dissection      Post op diagnosis:      ____  Intubated  TV:______       Rate: ______      FiO2: ______    _x___  Patent Airway    _x___  Full return of protective reflexes    _x___  Full recovery from anesthesia / back to baseline status    Vitals  SPO2:-100%on 2l  HR:-88  RR:-14  B.P:-125/67  TEMP:-98.2    Mental Status:  _x___ Awake   ___x_ Alert   _____ Drowsy   _____ Sedated    Nausea/Vomiting:  _x___  NO       ______Yes,   See Post - Op Orders         Pain Scale (0-10):  __0___    Treatment: _x___ None    __x__ See Post - Op/PCA Orders    Post - Operative Fluids:   ___ Oral   ____x See Post - Op Orders    Plan: Discharge:   _x___Home       ___Floor     _____Critical Care    _____  Other:_________________    Comments:  Report endorsed to RN in pacu  Vitals stable  No anesthesia issues or complications noted.  Discharge to patient to  home when criteria met.

## 2021-04-28 NOTE — ASU DISCHARGE PLAN (ADULT/PEDIATRIC) - ASU DC SPECIAL INSTRUCTIONSFT
Regular diet.    No heavy lifting more than 15 lbs for two weeks.    Please remove top plastic dressings in three days.  Leave white tape in place.  Wear a supportive bra.      Keep drain site clean and dry.  Visiting Nurse Services will call you to arrange an appointment at your home to take care of the drains.  Please contact the office if you do not make contact with them within 24 hours of your surgery.    Please start taking antibiotics tonight and complete the entire course.

## 2021-05-05 LAB — SURGICAL PATHOLOGY STUDY: SIGNIFICANT CHANGE UP

## 2021-05-06 ENCOUNTER — APPOINTMENT (OUTPATIENT)
Dept: BREAST CENTER | Facility: CLINIC | Age: 76
End: 2021-05-06
Payer: MEDICARE

## 2021-05-06 VITALS — BODY MASS INDEX: 23 KG/M2 | WEIGHT: 125 LBS | HEIGHT: 62 IN | TEMPERATURE: 98.2 F

## 2021-05-06 PROCEDURE — 99024 POSTOP FOLLOW-UP VISIT: CPT

## 2021-05-06 NOTE — REASON FOR VISIT
[Post Op: _________] : a [unfilled] post op visit [FreeTextEntry1] : s/p Right Simple Mastectomy / SNB - 04/28/2021.

## 2021-05-06 NOTE — HISTORY OF PRESENT ILLNESS
[FreeTextEntry1] : Patient with Right breast DCIS intermediate nuclear grade, papillary on ultrasound core biopsy 7/15/14; 10:00 N6-7, 18 mm.  \par Estrogen receptor positive\par Progesterone receptor positive\par \par Right breast DCIS intermediate nuclear grade, solid and papillary on ultrasound core biopsy 7/15/14; 11:00 N6-7, 3 mm.  \par \par History of Left breast biopsy for benign calcifications.\par Her family history is significant for her niece (sister's daughter) with breast cancer at 51.\par \par s/p Right NLOC of two areas and XOFT 9/15/14 demonstrating 4 mm mod diff IDC, with negative margins; extensive DCIS, micropapillary and solid types, intermediate nuclear grade, close superior, posterior and anterior margin.   No LVI or perineural invasion.\par \par Status post Right SNB 10/27/14 demonstrating 0/1 (-).  (pT1a pN0 pMx)\par \par Patient met with Dr. Wilkes and was on AI for three months, eventually discontinued  Arimidex and letrozole due to side effects.  She was not a candidate for chemotherapy.  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 AJCC 8th Edition Pathologic Stage: pTis (DCIS), pNx, pMx.\par \par Pt met w/ Dr. Garcia - (Feb 2020) she has declined adjuvant hormonal tx.\par \par s/p US Guided Core Bx - 02/16/2021:\par Right, 9:00 N3, 8mm: (hourglass)\par -Invasive ductal carcinoma with papillary features, moderately differentiated, associated with focal necrosis and microcalcifications.\par -Foreign body giant cell reaction.\par ER  (+)  %\par ME  (+)  41-50%\par HER2  (+)  by FISH.\par Ki-67   40%\par \par s/p Right Simple Mastectomy / SNB - 04/28/2021.

## 2021-05-06 NOTE — DATA REVIEWED
[FreeTextEntry1] : Surgical Final Report\par \par Final Diagnosis\par 1. Breast, right axillary sentinel lymph node, biopsy:\par - One benign lymph node (0/1) with widespread dermatopathic\par lymphadenitis. Negative for carcinoma with evaluation of multiple\par H T E levels and with negative immunohistochemical stains for\par cytokeratins (CK AE1/AE3 and CK 8/18).\par \par Note; The node parenchyma contains a diffuse histiocytic\par proliferation (CD68+, S100-) being further evaluated by a staff\par hematopathologist and with any additional findings by her to be\par reported as a separate addendum.\par \par 2. Breast, right, simple mastectomy:\par - Retroareolar/lateral remote post surgical site containing a\par recent prior biopsy site with invasive moderately differentiated\par ductal carcinoma, 1.5 cm (microscopic measurement), with focal\par solid papillary features.\par - Non-extensive ductal carcinoma in-situ (DCIS), micropapillary\par and papillary types with comedo necrosis, intermediate nuclear\par grade.\par - No lymphovascular invasion is seen.\par - The invasive carcinoma is located 1.0 mm from a yellow inked\par surface and the intraductal carcinoma (DCIS) extends to within\par 1.0 mm of the deep/fascial posterior surgical margin (microscopic\par measurements).\par - Largely atrophic fatty breast tissue containing a small\par hyalinizing fibroadenoma and focal squamous metaplasia of\par lactational ducts (SMOLD).\par - Unremarkable nipple, skin with multiple small cherry\par hemangiomas, and deep fascial margin including skeletal muscle.\par Negative for carcinoma.\par - Pending special studies for ER/KY proteins, proliferation\par index (Ki-67), and HER2/BRIANNA oncoprotein expression and/or gene\par amplification, with results to be reported as a separate\par addendum.\par - AJCC 8th Edition Pathologic Stage: pT1c, p(sn)N0, pMx.\par \par 3. Breast, right lateral margin, excision:\par - Benign skin and subcutaneous adipose tissue with focal remote\par post surgical site changes. Negative for carcinoma with evaluation of this specimen in its entirety.\par

## 2021-05-06 NOTE — ASSESSMENT
[FreeTextEntry1] : YADIRA is a herrera 75 year old patient who presented today to the office for her initial post-operative visit.\par She is s/p Right Simple Mastectomy / SNB on 04/28/2021.\par She is feeling well.\par She denies any fever / chills or erythema and / or drainage related to the incision.\par Her pain is well controlled, only complains of mild soreness of the area.\par Her incision was evaluated and pathology was discussed.\par \par Plan:\par 1. Pt to return next week for FELIX drain removal.\par 2. She will be sent for re-consultation with Dr. Garcia of Medical Oncology.\par 3. Follow up appointment for our office to be made for 4 weeks.\par \par I spent a total of 15 minutes of face to face time with this patient, greater than 50% of which was spent in counseling and/or coordination of care.\par All of her questions were appropriately answered.\par She knows to call with any concerns.\par \par

## 2021-05-07 DIAGNOSIS — Z17.0 ESTROGEN RECEPTOR POSITIVE STATUS [ER+]: ICD-10-CM

## 2021-05-07 DIAGNOSIS — D47.02 SYSTEMIC MASTOCYTOSIS: ICD-10-CM

## 2021-05-07 DIAGNOSIS — D24.1 BENIGN NEOPLASM OF RIGHT BREAST: ICD-10-CM

## 2021-05-07 DIAGNOSIS — D05.11 INTRADUCTAL CARCINOMA IN SITU OF RIGHT BREAST: ICD-10-CM

## 2021-05-07 DIAGNOSIS — D18.01 HEMANGIOMA OF SKIN AND SUBCUTANEOUS TISSUE: ICD-10-CM

## 2021-05-07 DIAGNOSIS — R01.1 CARDIAC MURMUR, UNSPECIFIED: ICD-10-CM

## 2021-05-07 DIAGNOSIS — I10 ESSENTIAL (PRIMARY) HYPERTENSION: ICD-10-CM

## 2021-05-07 DIAGNOSIS — I88.8 OTHER NONSPECIFIC LYMPHADENITIS: ICD-10-CM

## 2021-05-14 ENCOUNTER — NON-APPOINTMENT (OUTPATIENT)
Age: 76
End: 2021-05-14

## 2021-05-19 ENCOUNTER — NON-APPOINTMENT (OUTPATIENT)
Age: 76
End: 2021-05-19

## 2021-05-26 ENCOUNTER — APPOINTMENT (OUTPATIENT)
Dept: HEMATOLOGY ONCOLOGY | Facility: CLINIC | Age: 76
End: 2021-05-26
Payer: MEDICARE

## 2021-05-26 ENCOUNTER — RESULT REVIEW (OUTPATIENT)
Age: 76
End: 2021-05-26

## 2021-05-26 VITALS
SYSTOLIC BLOOD PRESSURE: 130 MMHG | WEIGHT: 120 LBS | HEIGHT: 62 IN | OXYGEN SATURATION: 99 % | RESPIRATION RATE: 16 BRPM | HEART RATE: 83 BPM | DIASTOLIC BLOOD PRESSURE: 71 MMHG | TEMPERATURE: 97.2 F | BODY MASS INDEX: 22.08 KG/M2

## 2021-05-26 PROCEDURE — 99215 OFFICE O/P EST HI 40 MIN: CPT

## 2021-05-28 ENCOUNTER — OUTPATIENT (OUTPATIENT)
Dept: OUTPATIENT SERVICES | Facility: HOSPITAL | Age: 76
LOS: 1 days | Discharge: HOME | End: 2021-05-28

## 2021-05-28 ENCOUNTER — NON-APPOINTMENT (OUTPATIENT)
Age: 76
End: 2021-05-28

## 2021-05-28 DIAGNOSIS — Z98.890 OTHER SPECIFIED POSTPROCEDURAL STATES: Chronic | ICD-10-CM

## 2021-06-01 ENCOUNTER — NON-APPOINTMENT (OUTPATIENT)
Age: 76
End: 2021-06-01

## 2021-06-01 DIAGNOSIS — M89.9 DISORDER OF BONE, UNSPECIFIED: ICD-10-CM

## 2021-06-01 DIAGNOSIS — Z13.820 ENCOUNTER FOR SCREENING FOR OSTEOPOROSIS: ICD-10-CM

## 2021-06-01 DIAGNOSIS — Z78.0 ASYMPTOMATIC MENOPAUSAL STATE: ICD-10-CM

## 2021-06-02 NOTE — CONSULT LETTER
[Dear  ___] : Dear  [unfilled], [Please see my note below.] : Please see my note below. [Sincerely,] : Sincerely, [Courtesy Letter:] : I had the pleasure of seeing your patient, [unfilled], in my office today. [FreeTextEntry3] : Gloria Garcia MD

## 2021-06-02 NOTE — ASSESSMENT
[FreeTextEntry1] : 1. Newly diagnosed stage IA ([id number]) IDC of the right breast, G2, triple positive, s/p simple mastectomy and SLBN\par 2. H/O right breast HR positive DCIS, s/p lumpectomy in 1/2020.\par 3. H/O Stage IA ([id number]) IDC of the right breast ER/UT positive s/p lumpectomy and IORT in 2014, unable to tolerate endocrine therapies.\par \par Recommendation:\par -- Discussed options of adjuvant systemic therapy for newly diagnosed triple positive right breast cancer. The pathology report was reviewed. Mindy has 1.5 cm IDC of right breast, G2, negative SLN. The tumor is triple positive. We discussed biologic nature of her-2 positive breast cancer being more aggressive and associated with increased risk of distant recurrence.  As per standard guideline, she is indicated for adjuvant chemotherapy plus Her-2 targeted therapy followed by adjuvant endocrine therapy. Given her elderly age, Mindy is not interested in getting chemotherapy. She agrees to take endocrine therapy and Her-2 targeted therapy with Herceptin. She understands that efficacy can be compromised without chemotherapy. However, benefit and side effect ratio should be carefully weighed in light of her age. \par -- We reviewed the side effects of Herceptin. it is a monoclonal antibody targeting to Her-2 and may cause infusional reaction. She will be given Benadryl and Tylenol prior to treatment. Herceptin may reduce heart function. She will need to have 2D Echo to evaluate LVEF prior to treatment and her heart function will be monitored every 3 months during one year course of treatment. \par -- We discussed adjuvant endocrine therapy. She had problem to tolerate AI previously but agrees to give a trial again. Letrozole is recommended. We discussed the side effects of Letrozole which may include but not limit to muscular and skeletal aching, arthralgia, loss of bone density, osteopenia and osteoporosis, a small increase risk of cardiovascular events and slightly increase of hyperlipidemia.\par -- We discussed bone health management. She will be referred for bone density. She is advised to take calcium and vitamine D supplement, and regular exercise. Additional recommendation will be made according to bone density report. \par -- She will continue screening mammo of left breast annually.\par \par All questions were answered. She agree with the plan. A script for letrozole was sent to her pharmacy. She may start immediately. She will have 2D Echo and start Herceptin on 6/8/21.

## 2021-06-02 NOTE — REVIEW OF SYSTEMS
[Insomnia] : insomnia [Negative] : Heme/Lymph [de-identified] : occasional insomnia is relieved with melatonin

## 2021-06-02 NOTE — HISTORY OF PRESENT ILLNESS
[de-identified] : 75 yo female is referred by Dr. 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 Patient was originally diagnosed with [id number] (4 mm, moderately differentiated) invasive ductal carcinoma of R breast, %, SC 25% in 2014.  She had lumpectomy and IORT.  She saw Dr. Wilkes and was started on anastrazole, switched to letrozole, and a third treatment she cannot recall.  She was not able to tolerate any of the medications for longer than about one month at a time.  She experienced severe insomnia, anxiety, night sweats, and unintentional weight loss.  She then stopped endocrine therapy.  She followed regularly with breast surgery and was compliant with screening mammograms.\par \par In 10/2019, screening mammogram detected R breast abnormality.  Diagnostic mammogram revealed R breast mass 6 mm 9:00 N3 (S-shape).  Biopsy revealed intermediate grade DCIS, ER %, SC %.  On 1/8/2020, patient underwent lumpectomy with Dr. 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  DCISion RT score was 3.7 (low risk).  Patient underwent re-excision on 1/27/2020 with negative margins.  She will forgo adjuvant breast radiotherapy.\par \par She now presents for discussion of adjuvant endocrine therapy. Patient indicates that secondary to severe side effects in the past, she is not interested in taking endocrine therapy.  She recovered well from surgery. [de-identified] : 5/26/21:\opal Guillen is here for followup visit. She was last time seen in 2/2020. She has h/o stage IA ( [id number]) HR -positive R-sided breast cancer s/p lumpectomy and IORT in 2014, unable to tolerate endocrine therapies. She had recurrent DCIS in the right and underwent lumpectomy on 1/8/2020, low to intermediate grade. DCISion RT score was 3.7 (low risk). She underwent re-excision on 1/27/2020 with negative margins. She did not have adjuvant breast radiotherapy. She also declined adjuvant endocrine therapy. On 1/22/21, she had b/l dx mammo and US which showed 2 lesions in the right breast. Biopsy revealed IDC, ER/ND positive and Her-2 positive (FISH 2.56). On 4/28/21, she had right breast simple mastectomy and SLNB. The pathology revealed 1.5 cm invasive moderately differentiated ductal carcinoma, ER pos 100%, ND pos 15%, Her-2 pos (FISH ratio 2.5), Ki 67 50%, ductal carcinoma in-situ (DCIS), micropapillary and papillary types with comedo necrosis, intermediate nuclear grade. No lymphovascular invasion is seen. One SLN is negative. AJCC 8th Edition Pathologic Stage: pT1c, p(sn)N0, pMx.\opal Guillen recovered well from surgery. She is here today to discuss adjuvant therapy. \par \par

## 2021-06-02 NOTE — REASON FOR VISIT
[Follow-Up Visit] : a follow-up [Initial Consultation] : an initial consultation [FreeTextEntry2] :  right breast cancer.

## 2021-06-02 NOTE — PHYSICAL EXAM
[Fully active, able to carry on all pre-disease performance without restriction] : Status 0 - Fully active, able to carry on all pre-disease performance without restriction [Normal] : affect appropriate [de-identified] : well-appearing [de-identified] : Status post right breast simple mastectomy and SLNB. Surgical incision is healing well with residual tissue swelling. There is no palpable abnormality in the left breast.

## 2021-06-08 ENCOUNTER — APPOINTMENT (OUTPATIENT)
Dept: INFUSION THERAPY | Facility: CLINIC | Age: 76
End: 2021-06-08

## 2021-06-08 ENCOUNTER — LABORATORY RESULT (OUTPATIENT)
Age: 76
End: 2021-06-08

## 2021-06-08 ENCOUNTER — OUTPATIENT (OUTPATIENT)
Dept: OUTPATIENT SERVICES | Facility: HOSPITAL | Age: 76
LOS: 1 days | Discharge: HOME | End: 2021-06-08

## 2021-06-08 ENCOUNTER — NON-APPOINTMENT (OUTPATIENT)
Age: 76
End: 2021-06-08

## 2021-06-08 DIAGNOSIS — Z98.890 OTHER SPECIFIED POSTPROCEDURAL STATES: Chronic | ICD-10-CM

## 2021-06-08 DIAGNOSIS — Z11.59 ENCOUNTER FOR SCREENING FOR OTHER VIRAL DISEASES: ICD-10-CM

## 2021-06-10 ENCOUNTER — FORM ENCOUNTER (OUTPATIENT)
Age: 76
End: 2021-06-10

## 2021-06-11 ENCOUNTER — OUTPATIENT (OUTPATIENT)
Dept: OUTPATIENT SERVICES | Facility: HOSPITAL | Age: 76
LOS: 1 days | Discharge: HOME | End: 2021-06-11
Payer: MEDICARE

## 2021-06-11 DIAGNOSIS — R92.8 OTHER ABNORMAL AND INCONCLUSIVE FINDINGS ON DIAGNOSTIC IMAGING OF BREAST: ICD-10-CM

## 2021-06-11 DIAGNOSIS — D05.11 INTRADUCTAL CARCINOMA IN SITU OF RIGHT BREAST: ICD-10-CM

## 2021-06-11 DIAGNOSIS — C50.411 MALIGNANT NEOPLASM OF UPPER-OUTER QUADRANT OF RIGHT FEMALE BREAST: ICD-10-CM

## 2021-06-11 DIAGNOSIS — Z98.890 OTHER SPECIFIED POSTPROCEDURAL STATES: Chronic | ICD-10-CM

## 2021-06-11 DIAGNOSIS — R01.1 CARDIAC MURMUR, UNSPECIFIED: ICD-10-CM

## 2021-06-11 PROCEDURE — 93306 TTE W/DOPPLER COMPLETE: CPT | Mod: 26

## 2021-06-24 ENCOUNTER — APPOINTMENT (OUTPATIENT)
Dept: BREAST CENTER | Facility: CLINIC | Age: 76
End: 2021-06-24
Payer: MEDICARE

## 2021-06-24 ENCOUNTER — NON-APPOINTMENT (OUTPATIENT)
Age: 76
End: 2021-06-24

## 2021-06-24 VITALS
DIASTOLIC BLOOD PRESSURE: 80 MMHG | SYSTOLIC BLOOD PRESSURE: 126 MMHG | HEIGHT: 62 IN | BODY MASS INDEX: 22.08 KG/M2 | WEIGHT: 120 LBS | TEMPERATURE: 98 F

## 2021-06-24 PROCEDURE — 99024 POSTOP FOLLOW-UP VISIT: CPT

## 2021-06-25 NOTE — ASSESSMENT
[FreeTextEntry1] : YADIRA is a herrrea 75 year old patient who presented today to the office for an 8 week post-operative visit.\par She is s/p Right Simple Mastectomy / SNB on 04/28/2021.\par She is feeling well.\par She denies any fever / chills or erythema and / or drainage related to the incision.\par A retained suture at her FELIX drain site was removed today. \par \par Plan:\par 1. She will be starting Herceptin / Perjeta regimen with Dr. Garcia of Medical Oncology.\par 2. She is due for left breast sono for short-term follow-up in July 2021.\par 3. She will be set up for follow-up and clinical breast exam with Dr. Gardner in three months. \par \par I spent a total of 15 minutes of face to face time with this patient, greater than 50% of which was spent in counseling and/or coordination of care.\par All of her questions were appropriately answered.\par She knows to call with any concerns.\par

## 2021-06-25 NOTE — REASON FOR VISIT
[Follow-Up: _____] : a [unfilled] follow-up visit [FreeTextEntry1] : recurrent right breast ca; s/p mastectomy.

## 2021-06-25 NOTE — HISTORY OF PRESENT ILLNESS
[FreeTextEntry1] : Patient with Right breast DCIS intermediate nuclear grade, papillary on ultrasound core biopsy 7/15/14; 10:00 N6-7, 18 mm.  \par Estrogen receptor positive\par Progesterone receptor positive\par \par Right breast DCIS intermediate nuclear grade, solid and papillary on ultrasound core biopsy 7/15/14; 11:00 N6-7, 3 mm.  \par \par History of Left breast biopsy for benign calcifications.\par Her family history is significant for her niece (sister's daughter) with breast cancer at 51.\par \par s/p Right NLOC of two areas and XOFT 9/15/14 demonstrating 4 mm mod diff IDC, with negative margins; extensive DCIS, micropapillary and solid types, intermediate nuclear grade, close superior, posterior and anterior margin.   No LVI or perineural invasion.\par \par Status post Right SNB 10/27/14 demonstrating 0/1 (-).  (pT1a pN0 pMx)\par \par Patient met with Dr. Wilkes and was on AI for three months, eventually discontinued  Arimidex and letrozole due to side effects.  She was not a candidate for chemotherapy.  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 AJCC 8th Edition Pathologic Stage: pTis (DCIS), pNx, pMx.\par \par Pt met w/ Dr. Garcia - (Feb 2020) she has declined adjuvant hormonal tx.\par \par s/p US Guided Core Bx - 02/16/2021:\par Right, 9:00 N3, 8mm: (hourglass)\par -Invasive ductal carcinoma with papillary features, moderately differentiated, associated with focal necrosis and microcalcifications.\par -Foreign body giant cell reaction.\par ER  (+)  %\par NC  (+)  41-50%\par HER2  (+)  by FISH.\par Ki-67   40%\par \par s/p Right Simple Mastectomy / SNB - 04/28/2021.\par \par Dr Garcia - Letrozole.  Pt also scheduled to begin Herceptin / Perjeta.

## 2021-06-29 ENCOUNTER — APPOINTMENT (OUTPATIENT)
Dept: HEMATOLOGY ONCOLOGY | Facility: CLINIC | Age: 76
End: 2021-06-29

## 2021-07-06 ENCOUNTER — NON-APPOINTMENT (OUTPATIENT)
Age: 76
End: 2021-07-06

## 2021-07-08 ENCOUNTER — LABORATORY RESULT (OUTPATIENT)
Age: 76
End: 2021-07-08

## 2021-07-08 ENCOUNTER — APPOINTMENT (OUTPATIENT)
Dept: INFUSION THERAPY | Facility: CLINIC | Age: 76
End: 2021-07-08

## 2021-07-08 VITALS
TEMPERATURE: 97.6 F | RESPIRATION RATE: 15 BRPM | BODY MASS INDEX: 22.37 KG/M2 | SYSTOLIC BLOOD PRESSURE: 144 MMHG | WEIGHT: 120 LBS | DIASTOLIC BLOOD PRESSURE: 92 MMHG | HEART RATE: 76 BPM | HEIGHT: 61.5 IN

## 2021-07-08 DIAGNOSIS — Z00.00 ENCOUNTER FOR GENERAL ADULT MEDICAL EXAMINATION W/OUT ABNORMAL FINDINGS: ICD-10-CM

## 2021-07-08 RX ORDER — TRASTUZUMAB-DKST 420 MG/20ML
435 INJECTION, POWDER, LYOPHILIZED, FOR SOLUTION INTRAVENOUS ONCE
Refills: 0 | Status: COMPLETED | OUTPATIENT
Start: 2021-07-08 | End: 2021-07-08

## 2021-07-08 RX ORDER — ACETAMINOPHEN 500 MG
650 TABLET ORAL ONCE
Refills: 0 | Status: COMPLETED | OUTPATIENT
Start: 2021-07-08 | End: 2021-07-08

## 2021-07-08 RX ORDER — DIPHENHYDRAMINE HCL 50 MG
25 CAPSULE ORAL ONCE
Refills: 0 | Status: COMPLETED | OUTPATIENT
Start: 2021-07-08 | End: 2021-07-08

## 2021-07-08 RX ADMIN — Medication 101 MILLIGRAM(S): at 10:15

## 2021-07-08 RX ADMIN — TRASTUZUMAB-DKST 180.47 MILLIGRAM(S): 420 INJECTION, POWDER, LYOPHILIZED, FOR SOLUTION INTRAVENOUS at 11:03

## 2021-07-08 RX ADMIN — Medication 650 MILLIGRAM(S): at 10:12

## 2021-07-29 ENCOUNTER — APPOINTMENT (OUTPATIENT)
Dept: INFUSION THERAPY | Facility: CLINIC | Age: 76
End: 2021-07-29
Payer: MEDICARE

## 2021-07-29 ENCOUNTER — LABORATORY RESULT (OUTPATIENT)
Age: 76
End: 2021-07-29

## 2021-07-29 ENCOUNTER — APPOINTMENT (OUTPATIENT)
Dept: HEMATOLOGY ONCOLOGY | Facility: CLINIC | Age: 76
End: 2021-07-29
Payer: MEDICARE

## 2021-07-29 VITALS — HEIGHT: 61.5 IN | WEIGHT: 120 LBS | BODY MASS INDEX: 22.37 KG/M2 | HEART RATE: 78 BPM | TEMPERATURE: 97.5 F

## 2021-07-29 PROCEDURE — 99214 OFFICE O/P EST MOD 30 MIN: CPT

## 2021-07-29 RX ORDER — DIPHENHYDRAMINE HCL 50 MG
25 CAPSULE ORAL ONCE
Refills: 0 | Status: COMPLETED | OUTPATIENT
Start: 2021-07-29 | End: 2021-07-29

## 2021-07-29 RX ORDER — TRASTUZUMAB-DKST 420 MG/20ML
325 INJECTION, POWDER, LYOPHILIZED, FOR SOLUTION INTRAVENOUS ONCE
Refills: 0 | Status: COMPLETED | OUTPATIENT
Start: 2021-07-29 | End: 2021-07-29

## 2021-07-29 RX ORDER — ACETAMINOPHEN 500 MG
650 TABLET ORAL ONCE
Refills: 0 | Status: COMPLETED | OUTPATIENT
Start: 2021-07-29 | End: 2021-07-29

## 2021-07-29 RX ADMIN — Medication 650 MILLIGRAM(S): at 15:55

## 2021-07-29 RX ADMIN — TRASTUZUMAB-DKST 530.94 MILLIGRAM(S): 420 INJECTION, POWDER, LYOPHILIZED, FOR SOLUTION INTRAVENOUS at 16:32

## 2021-07-29 RX ADMIN — TRASTUZUMAB-DKST 325 MILLIGRAM(S): 420 INJECTION, POWDER, LYOPHILIZED, FOR SOLUTION INTRAVENOUS at 17:02

## 2021-07-29 RX ADMIN — Medication 101 MILLIGRAM(S): at 15:55

## 2021-08-01 NOTE — ASSESSMENT
[FreeTextEntry1] : 1. Newly diagnosed stage IA ([id number]) IDC of the right breast, G2, triple positive, s/p simple mastectomy and SLBN\par 2. H/O right breast HR positive DCIS, s/p lumpectomy in 1/2020.\par 3. H/O Stage IA ([id number]) IDC of the right breast ER/TN positive s/p lumpectomy and IORT in 2014, unable to tolerate endocrine therapies.\par \par Recommendation:\par -- Continue Letrozole daily. \par -- Proceed with 2nd dose of Herceptin today and every 3 weeks. \par -- Bone density 5/28/21 showed osteopenia. Continue calcium and vitamin D supplement. \par -- Breast exam today did not reveal palpable abnormality. She will continue screening mammo of left breast annually.\par -- 2D [place] [address] showed normal LVEF. Will monitor every 3 months.\par -- RTO for followup in 6 weeks.\par \par Patient seen and examined by Dr JOHNS who agreed for the above plan of care.

## 2021-08-01 NOTE — REVIEW OF SYSTEMS
[Insomnia] : insomnia [Negative] : Heme/Lymph [de-identified] : occasional insomnia is relieved with melatonin

## 2021-08-01 NOTE — HISTORY OF PRESENT ILLNESS
[de-identified] : 75 yo female is referred by Dr. 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 Patient was originally diagnosed with [id number] (4 mm, moderately differentiated) invasive ductal carcinoma of R breast, %, [place] 25% in 2014.  She had lumpectomy and IORT.  She saw Dr. Wilkes and was started on anastrazole, switched to letrozole, and a third treatment she cannot recall.  She was not able to tolerate any of the medications for longer than about one month at a time.  She experienced severe insomnia, anxiety, night sweats, and unintentional weight loss.  She then stopped endocrine therapy.  She followed regularly with breast surgery and was compliant with screening mammograms.\par \par In 10/2019, screening mammogram detected R breast abnormality.  Diagnostic mammogram revealed R breast mass 6 mm 9:00 N3 (S-shape).  Biopsy revealed intermediate grade DCIS, ER %, [place] %.  On 1/8/2020, patient underwent lumpectomy with Dr. 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  DCISion RT score was 3.7 (low risk).  Patient underwent re-excision on 1/27/2020 with negative margins.  She will forgo adjuvant breast radiotherapy.\par \par She now presents for discussion of adjuvant endocrine therapy. Patient indicates that secondary to severe side effects in the past, she is not interested in taking endocrine therapy.  She recovered well from surgery. [de-identified] : 5/26/21:\opal Guillen is here for followup visit. She was last time seen in 2/2020. She has h/o stage IA ( [id number]) HR -positive R-sided breast cancer s/p lumpectomy and IORT in 2014, unable to tolerate endocrine therapies. She had recurrent DCIS in the right and underwent lumpectomy on 1/8/2020, low to intermediate grade. DCISion RT score was 3.7 (low risk). She underwent re-excision on 1/27/2020 with negative margins. She did not have adjuvant breast radiotherapy. She also declined adjuvant endocrine therapy. On 1/22/21, she had b/l dx mammo and US which showed 2 lesions in the right breast. Biopsy revealed IDC, ER/MN positive and Her-2 positive (FISH 2.56). On 4/28/21, she had right breast simple mastectomy and SLNB. The pathology revealed 1.5 cm invasive moderately differentiated ductal carcinoma, ER pos 100%, MN pos 15%, Her-2 pos (FISH ratio 2.5), Ki 67 50%, ductal carcinoma in-situ (DCIS), micropapillary and papillary types with comedo necrosis, intermediate nuclear grade. No lymphovascular invasion is seen. One SLN is negative. AJCC 8th Edition Pathologic Stage: pT1c, p(sn)N0, pMx.\opal Guillen recovered well from surgery. She is here today to discuss adjuvant therapy. \par 7/29/21:\opal Guillen is here for followup visit and treatment. She has stage IA ([id number]) IDC of the right breast, G2, triple positive, s/p simple mastectomy and SLBN. She started on adjuvant endocrine therapy with Letrozole since 6/2021 and Herceptin in 7/2021. Due to her age, she opted not to have chemotherapy. She reports tolerating treatment well. \par 2D- ECHO from 6/11/21 showed  Normal global left ventricular systolic function. LV Ejection Fraction by Wells's Method with a biplane EF of 60 %.\par \par \par

## 2021-08-01 NOTE — CONSULT LETTER
[Dear  ___] : Dear  [unfilled], [Courtesy Letter:] : I had the pleasure of seeing your patient, [unfilled], in my office today. [Please see my note below.] : Please see my note below. [Sincerely,] : Sincerely, [FreeTextEntry3] : Gloria Garcia MD

## 2021-08-01 NOTE — PHYSICAL EXAM
[Fully active, able to carry on all pre-disease performance without restriction] : Status 0 - Fully active, able to carry on all pre-disease performance without restriction [Normal] : affect appropriate [de-identified] : well-appearing [de-identified] : Status post right breast simple mastectomy and SLNB. Surgical incision is healing well with residual tissue swelling. There is no palpable abnormality in the left breast.

## 2021-08-18 ENCOUNTER — APPOINTMENT (OUTPATIENT)
Dept: INFUSION THERAPY | Facility: CLINIC | Age: 76
End: 2021-08-18

## 2021-08-18 ENCOUNTER — LABORATORY RESULT (OUTPATIENT)
Age: 76
End: 2021-08-18

## 2021-08-18 RX ORDER — TRASTUZUMAB-DKST 420 MG/20ML
325 INJECTION, POWDER, LYOPHILIZED, FOR SOLUTION INTRAVENOUS ONCE
Refills: 0 | Status: COMPLETED | OUTPATIENT
Start: 2021-08-18 | End: 2021-08-18

## 2021-08-18 RX ORDER — ACETAMINOPHEN 500 MG
650 TABLET ORAL ONCE
Refills: 0 | Status: COMPLETED | OUTPATIENT
Start: 2021-08-18 | End: 2021-08-18

## 2021-08-18 RX ORDER — DIPHENHYDRAMINE HCL 50 MG
25 CAPSULE ORAL ONCE
Refills: 0 | Status: COMPLETED | OUTPATIENT
Start: 2021-08-18 | End: 2021-08-18

## 2021-08-18 RX ADMIN — Medication 650 MILLIGRAM(S): at 09:04

## 2021-08-18 RX ADMIN — TRASTUZUMAB-DKST 530.94 MILLIGRAM(S): 420 INJECTION, POWDER, LYOPHILIZED, FOR SOLUTION INTRAVENOUS at 09:24

## 2021-08-18 RX ADMIN — Medication 101 MILLIGRAM(S): at 09:05

## 2021-09-08 ENCOUNTER — LABORATORY RESULT (OUTPATIENT)
Age: 76
End: 2021-09-08

## 2021-09-08 ENCOUNTER — APPOINTMENT (OUTPATIENT)
Dept: HEMATOLOGY ONCOLOGY | Facility: CLINIC | Age: 76
End: 2021-09-08
Payer: MEDICARE

## 2021-09-08 VITALS
DIASTOLIC BLOOD PRESSURE: 81 MMHG | HEIGHT: 61 IN | WEIGHT: 120 LBS | SYSTOLIC BLOOD PRESSURE: 121 MMHG | TEMPERATURE: 97.3 F | BODY MASS INDEX: 22.66 KG/M2 | HEART RATE: 69 BPM

## 2021-09-08 PROCEDURE — 99214 OFFICE O/P EST MOD 30 MIN: CPT

## 2021-09-09 ENCOUNTER — APPOINTMENT (OUTPATIENT)
Dept: INFUSION THERAPY | Facility: CLINIC | Age: 76
End: 2021-09-09

## 2021-09-09 RX ORDER — TRASTUZUMAB-DKST 420 MG/20ML
325 INJECTION, POWDER, LYOPHILIZED, FOR SOLUTION INTRAVENOUS ONCE
Refills: 0 | Status: COMPLETED | OUTPATIENT
Start: 2021-09-09 | End: 2021-09-09

## 2021-09-09 RX ORDER — ACETAMINOPHEN 500 MG
650 TABLET ORAL ONCE
Refills: 0 | Status: COMPLETED | OUTPATIENT
Start: 2021-09-09 | End: 2021-09-09

## 2021-09-09 RX ORDER — DIPHENHYDRAMINE HCL 50 MG
25 CAPSULE ORAL ONCE
Refills: 0 | Status: COMPLETED | OUTPATIENT
Start: 2021-09-09 | End: 2021-09-09

## 2021-09-09 RX ADMIN — Medication 25 MILLIGRAM(S): at 09:30

## 2021-09-09 RX ADMIN — Medication 650 MILLIGRAM(S): at 09:09

## 2021-09-09 RX ADMIN — TRASTUZUMAB-DKST 530.94 MILLIGRAM(S): 420 INJECTION, POWDER, LYOPHILIZED, FOR SOLUTION INTRAVENOUS at 09:28

## 2021-09-09 RX ADMIN — Medication 650 MILLIGRAM(S): at 09:10

## 2021-09-09 RX ADMIN — Medication 101 MILLIGRAM(S): at 09:09

## 2021-09-09 RX ADMIN — TRASTUZUMAB-DKST 325 MILLIGRAM(S): 420 INJECTION, POWDER, LYOPHILIZED, FOR SOLUTION INTRAVENOUS at 10:05

## 2021-09-12 NOTE — PHYSICAL EXAM
[Fully active, able to carry on all pre-disease performance without restriction] : Status 0 - Fully active, able to carry on all pre-disease performance without restriction [Normal] : affect appropriate [de-identified] : well-appearing [de-identified] : Status post right breast simple mastectomy and SLNB. Surgical incision is healing well with residual tissue swelling. There is no palpable abnormality in the left breast.

## 2021-09-12 NOTE — HISTORY OF PRESENT ILLNESS
[de-identified] : 73 yo female is referred by Dr. 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 Patient was originally diagnosed with [id number] (4 mm, moderately differentiated) invasive ductal carcinoma of R breast, %, [place] 25% in 2014.  She had lumpectomy and IORT.  She saw Dr. Wilkes and was started on anastrazole, switched to letrozole, and a third treatment she cannot recall.  She was not able to tolerate any of the medications for longer than about one month at a time.  She experienced severe insomnia, anxiety, night sweats, and unintentional weight loss.  She then stopped endocrine therapy.  She followed regularly with breast surgery and was compliant with screening mammograms.\par \par In 10/2019, screening mammogram detected R breast abnormality.  Diagnostic mammogram revealed R breast mass 6 mm 9:00 N3 (S-shape).  Biopsy revealed intermediate grade DCIS, ER %, [place] %.  On 1/8/2020, patient underwent lumpectomy with Dr. 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  DCISion RT score was 3.7 (low risk).  Patient underwent re-excision on 1/27/2020 with negative margins.  She will forgo adjuvant breast radiotherapy.\par \par She now presents for discussion of adjuvant endocrine therapy. Patient indicates that secondary to severe side effects in the past, she is not interested in taking endocrine therapy.  She recovered well from surgery. [de-identified] : 5/26/21:\opal Guillen is here for followup visit. She was last time seen in 2/2020. She has h/o stage IA ( [id number]) HR -positive R-sided breast cancer s/p lumpectomy and IORT in 2014, unable to tolerate endocrine therapies. She had recurrent DCIS in the right and underwent lumpectomy on 1/8/2020, low to intermediate grade. DCISion RT score was 3.7 (low risk). She underwent re-excision on 1/27/2020 with negative margins. She did not have adjuvant breast radiotherapy. She also declined adjuvant endocrine therapy. On 1/22/21, she had b/l dx mammo and US which showed 2 lesions in the right breast. Biopsy revealed IDC, ER/MD positive and Her-2 positive (FISH 2.56). On 4/28/21, she had right breast simple mastectomy and SLNB. The pathology revealed 1.5 cm invasive moderately differentiated ductal carcinoma, ER pos 100%, MD pos 15%, Her-2 pos (FISH ratio 2.5), Ki 67 50%, ductal carcinoma in-situ (DCIS), micropapillary and papillary types with comedo necrosis, intermediate nuclear grade. No lymphovascular invasion is seen. One SLN is negative. AJCC 8th Edition Pathologic Stage: pT1c, p(sn)N0, pMx.\opal Guillen recovered well from surgery. She is here today to discuss adjuvant therapy. \par \par 7/29/21:shanthi Guillen is here for followup visit and treatment. She has stage IA ([id number]) IDC of the right breast, G2, triple positive, s/p simple mastectomy and SLBN. She started on adjuvant endocrine therapy with Letrozole since 6/2021 and Herceptin in 7/2021. Due to her age, she opted not to have chemotherapy. She reports tolerating treatment well. \par 2D- ECHO from 6/11/21 showed  Normal global left ventricular systolic function. LV Ejection Fraction by Wells's Method with a biplane EF of 60 %.\par \par \par 9/8/21:shanthi Guillen is here for followup visit and treatment. She has stage IA ([id number]) IDC of the right breast, G2, triple positive, s/p simple mastectomy and SLBN. She started on adjuvant endocrine therapy with Letrozole since 6/2021 and Herceptin in 7/2021. Due to her age, she opted not to have chemotherapy. She reports tolerating treatment well. \par 2D- ECHO from 6/11/21 showed  Normal global left ventricular systolic function. LV Ejection Fraction by Wells's Method with a biplane EF of 60 %.  ECHO should be repeated this month. She is status post 3 doses so far, tolerating well.  Scheduled for 4th dose tomorrow. She admits to some degree of fatigue and some level of anxiety.  She feels more nervous than usual.  Her appetite is good.\par \par

## 2021-09-12 NOTE — ASSESSMENT
[FreeTextEntry1] : 1. Newly diagnosed stage IA ([id number]) IDC of the right breast, G2, triple positive, s/p simple mastectomy and SLBN\par 2. H/O right breast HR positive DCIS, s/p lumpectomy in 1/2020.\par 3. H/O Stage IA ([id number]) IDC of the right breast ER/IA positive s/p lumpectomy and IORT in 2014, unable to tolerate endocrine therapies.\par \par Recommendation:\par -- Continue Letrozole daily. \par -- Proceed with 4th dose of Herceptin tomorrow and every 3 weeks. \par -- Bone density 5/28/21 showed osteopenia. Continue calcium and vitamin D supplement. \par -- Breast exam today did not reveal palpable abnormality. She will continue screening mammo of left breast annually.\par -- 2D Echo in 6/2021 showed normal LVEF. Will monitor every 3 months. Referral given\par --mastectomy bra with prosthesis provided\par -- RTO for followup in 6 weeks.\par \par Patient seen and examined by Dr JOHNS who agreed for the above plan of care.

## 2021-09-12 NOTE — REVIEW OF SYSTEMS
[Insomnia] : insomnia [Negative] : Heme/Lymph [Fatigue] : fatigue [FreeTextEntry2] : anxious, at times feels depressed [de-identified] : occasional insomnia is relieved with melatonin

## 2021-09-20 ENCOUNTER — OUTPATIENT (OUTPATIENT)
Dept: OUTPATIENT SERVICES | Facility: HOSPITAL | Age: 76
LOS: 1 days | Discharge: HOME | End: 2021-09-20

## 2021-09-20 ENCOUNTER — LABORATORY RESULT (OUTPATIENT)
Age: 76
End: 2021-09-20

## 2021-09-20 DIAGNOSIS — Z11.59 ENCOUNTER FOR SCREENING FOR OTHER VIRAL DISEASES: ICD-10-CM

## 2021-09-20 DIAGNOSIS — Z98.890 OTHER SPECIFIED POSTPROCEDURAL STATES: Chronic | ICD-10-CM

## 2021-09-22 ENCOUNTER — FORM ENCOUNTER (OUTPATIENT)
Age: 76
End: 2021-09-22

## 2021-09-23 ENCOUNTER — OUTPATIENT (OUTPATIENT)
Dept: OUTPATIENT SERVICES | Facility: HOSPITAL | Age: 76
LOS: 1 days | Discharge: HOME | End: 2021-09-23
Payer: MEDICARE

## 2021-09-23 DIAGNOSIS — Z98.890 OTHER SPECIFIED POSTPROCEDURAL STATES: Chronic | ICD-10-CM

## 2021-09-23 DIAGNOSIS — Z51.11 ENCOUNTER FOR ANTINEOPLASTIC CHEMOTHERAPY: ICD-10-CM

## 2021-09-23 DIAGNOSIS — R92.8 OTHER ABNORMAL AND INCONCLUSIVE FINDINGS ON DIAGNOSTIC IMAGING OF BREAST: ICD-10-CM

## 2021-09-23 DIAGNOSIS — C50.411 MALIGNANT NEOPLASM OF UPPER-OUTER QUADRANT OF RIGHT FEMALE BREAST: ICD-10-CM

## 2021-09-23 DIAGNOSIS — D05.11 INTRADUCTAL CARCINOMA IN SITU OF RIGHT BREAST: ICD-10-CM

## 2021-09-23 PROCEDURE — 93306 TTE W/DOPPLER COMPLETE: CPT | Mod: 26

## 2021-09-29 ENCOUNTER — APPOINTMENT (OUTPATIENT)
Dept: INFUSION THERAPY | Facility: CLINIC | Age: 76
End: 2021-09-29

## 2021-09-29 ENCOUNTER — OUTPATIENT (OUTPATIENT)
Dept: OUTPATIENT SERVICES | Facility: HOSPITAL | Age: 76
LOS: 1 days | Discharge: HOME | End: 2021-09-29

## 2021-09-29 ENCOUNTER — LABORATORY RESULT (OUTPATIENT)
Age: 76
End: 2021-09-29

## 2021-09-29 DIAGNOSIS — C50.411 MALIGNANT NEOPLASM OF UPPER-OUTER QUADRANT OF RIGHT FEMALE BREAST: ICD-10-CM

## 2021-09-29 DIAGNOSIS — Z51.81 ENCOUNTER FOR THERAPEUTIC DRUG LEVEL MONITORING: ICD-10-CM

## 2021-09-29 DIAGNOSIS — Z51.11 ENCOUNTER FOR ANTINEOPLASTIC CHEMOTHERAPY: ICD-10-CM

## 2021-09-29 DIAGNOSIS — Z98.890 OTHER SPECIFIED POSTPROCEDURAL STATES: Chronic | ICD-10-CM

## 2021-09-29 DIAGNOSIS — D05.11 INTRADUCTAL CARCINOMA IN SITU OF RIGHT BREAST: ICD-10-CM

## 2021-09-29 RX ORDER — TRASTUZUMAB-DKST 420 MG/20ML
325 INJECTION, POWDER, LYOPHILIZED, FOR SOLUTION INTRAVENOUS ONCE
Refills: 0 | Status: COMPLETED | OUTPATIENT
Start: 2021-09-29 | End: 2021-09-29

## 2021-09-29 RX ORDER — ACETAMINOPHEN 500 MG
650 TABLET ORAL ONCE
Refills: 0 | Status: COMPLETED | OUTPATIENT
Start: 2021-09-29 | End: 2021-09-29

## 2021-09-29 RX ORDER — DIPHENHYDRAMINE HCL 50 MG
25 CAPSULE ORAL ONCE
Refills: 0 | Status: COMPLETED | OUTPATIENT
Start: 2021-09-29 | End: 2021-09-29

## 2021-09-29 RX ADMIN — TRASTUZUMAB-DKST 530.94 MILLIGRAM(S): 420 INJECTION, POWDER, LYOPHILIZED, FOR SOLUTION INTRAVENOUS at 10:56

## 2021-09-29 RX ADMIN — TRASTUZUMAB-DKST 325 MILLIGRAM(S): 420 INJECTION, POWDER, LYOPHILIZED, FOR SOLUTION INTRAVENOUS at 11:26

## 2021-09-29 RX ADMIN — Medication 650 MILLIGRAM(S): at 09:59

## 2021-09-29 RX ADMIN — Medication 101 MILLIGRAM(S): at 09:59

## 2021-09-30 ENCOUNTER — NON-APPOINTMENT (OUTPATIENT)
Age: 76
End: 2021-09-30

## 2021-10-04 ENCOUNTER — APPOINTMENT (OUTPATIENT)
Dept: BREAST CENTER | Facility: CLINIC | Age: 76
End: 2021-10-04
Payer: MEDICARE

## 2021-10-04 VITALS
TEMPERATURE: 98.3 F | BODY MASS INDEX: 22.66 KG/M2 | DIASTOLIC BLOOD PRESSURE: 80 MMHG | WEIGHT: 120 LBS | HEIGHT: 61 IN | SYSTOLIC BLOOD PRESSURE: 126 MMHG

## 2021-10-04 DIAGNOSIS — Z91.89 OTHER SPECIFIED PERSONAL RISK FACTORS, NOT ELSEWHERE CLASSIFIED: ICD-10-CM

## 2021-10-04 PROCEDURE — 99212 OFFICE O/P EST SF 10 MIN: CPT

## 2021-10-04 NOTE — DATA REVIEWED
[FreeTextEntry1] :  \par \par \par \par Sep 2, 2021  9:03 [phone]  \par \par \par \par \par \par Assessment Category\par \par \par Probably Benign [3] \par \par \par \par \par \par Study Result\par \par \par \par Narrative & Impression \par \par \par \par \par US BREAST LIMITED BILATERAL\par \par  \par \par  \par \par  \par \par Clinical Indication: Patient complains of occasional pain in the right axilla. Patient is status post right mastectomy. Patient having a short term follow up of left breast.\par \par  \par \par Compared to: 02/16/2021, 02/16/2021, 01/22/2021, 12/03/2019, 11/13/2019, 10/05/2017, 09/04/2015, and 07/09/2014\par \par  \par \par A limited ultrasound evaluation of the breast(s) was/were performed.\par \par  \par \par In the left breast at 2:00 location 4 cm from nipple there is a stable probably benign mass measuring 0.6 x 0.3 x 0.6 cm.\par \par In the left breast at 2:00 location 3 cm from the nipple there is a stable probably benign mass measuring 0.4 x 0.2 x 0.4 cm. Follow-up ultrasound in 6 months is recommended.\par \par In the right axillary tail corresponding to the area of pain, no suspicious abnormalities were identified.\par \par  \par \par Electronic Signature: I personally reviewed the images and agree with this report. Final Report: Dictated by  and Signed by Attending Kerrie Huitron MD 9/2/2021 9:03 AM\par \par  \par \par IMPRESSION: \par \par  \par \par Stable probably benign masses in the left breast as described above. Follow-up ultrasound in 6 months is recommended.\par \par  \par \par Right axillary tail breast pain without sonographic correlate. Further evaluation should be based on clinical grounds.\par \par  \par \par As further detailed above, a 6 month follow-up DIAGNOSTIC imaging exam of the left breast(s) is recommended. A letter will be sent to the patient to return for follow up.\par \par  \par \par BI-RADS 3: PROBABLY BENIGN\par \par   \par

## 2021-10-04 NOTE — ASSESSMENT
[FreeTextEntry1] : YADIRA is a herrera 75 year old patient who presented today to the office \par She is s/p Right Simple Mastectomy / SNB on 04/28/2021.\par \par On physical exam, there are no discrete masses in LEFT breast or axilla. There is no nipple discharge or inversion noted. There are no skin changes bilaterally.\par \par We discussed BIRADS 3 lesions. These lesions have a 2% chance of harboring malignancy. There is always an option to obtain a tissue sample with a biopsy to confirm the diagnosis. The procedure was described in detail including the placement of a tissue marker clip. The risks of the procedure, including but not limited to bleeding and infection were also explained. She is not interested in biopsy at this time and agrees to continue with short-term interval imaging, which is traditionally performed at six-month intervals for approximately two years to establish stability.\par \par \par Plan:\par 1. Continue follow up with  Medical Oncology.\par 2. She is due for LEFT breast dx mammo and US in January 2022\par 3.Follow up after  \par \par \par 
normal

## 2021-10-04 NOTE — HISTORY OF PRESENT ILLNESS
[FreeTextEntry1] : Patient with Right breast DCIS intermediate nuclear grade, papillary on ultrasound core biopsy 7/15/14; 10:00 N6-7, 18 mm.  \par Estrogen receptor positive\par Progesterone receptor positive\par \par Right breast DCIS intermediate nuclear grade, solid and papillary on ultrasound core biopsy 7/15/14; 11:00 N6-7, 3 mm.  \par \par History of Left breast biopsy for benign calcifications.\par Her family history is significant for her niece (sister's daughter) with breast cancer at 51.\par \par s/p Right NLOC of two areas and XOFT 9/15/14 demonstrating 4 mm mod diff IDC, with negative margins; extensive DCIS, micropapillary and solid types, intermediate nuclear grade, close superior, posterior and anterior margin.   No LVI or perineural invasion.\par \par Status post Right SNB 10/27/14 demonstrating 0/1 (-).  (pT1a pN0 pMx)\par \par Patient met with Dr. Wilkes and was on AI for three months, eventually discontinued  Arimidex and letrozole due to side effects.  She was not a candidate for chemotherapy.  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 AJCC 8th Edition Pathologic Stage: pTis (DCIS), pNx, pMx.\par \par Pt met w/ Dr. Garcia - (Feb 2020) she has declined adjuvant hormonal tx.\par \par s/p US Guided Core Bx - 02/16/2021:\par Right, 9:00 N3, 8mm: (hourglass)\par -Invasive ductal carcinoma with papillary features, moderately differentiated, associated with focal necrosis and microcalcifications.\par -Foreign body giant cell reaction.\par ER  (+)  %\par IA  (+)  41-50%\par HER2  (+)  by FISH.\par Ki-67   40%\par \par s/p Right Simple Mastectomy / SNB - 04/28/2021.\par \par Dr Garcia - Letrozole.  Pt also scheduled to begin Herceptin / Perjeta.\par \par INTERVAL HISTORY 10/04/21\par Mindy is 75 year old female here for her SIX MONTHS follow up \par She has stage IA ([id number]) IDC of the right breast, G2, triple positive, s/p simple mastectomy and SLBN. She started on adjuvant endocrine therapy with Letrozole since 6/2021 and Herceptin in 7/2021. Due to her age, she opted not to have chemotherapy. She reports tolerating treatment well. \par She has no breast related complaints at present time\par \par On her imaging:\par 09/2/21 U/S B/L\par LEFT US:\par -@2:00N 4 stable probably benign mass measuring 0.6 x 0.3 x 0.6 cm.\par -@2:00 N 3 stable probably benign mass measuring 0.4 x 0.2 x 0.4 cm--> ultrasound in 6 months is       recommended.\par RIGHT AXILLA:\par -right axillary tail corresponding to the area of pain, no suspicious abnormalities were identified.\par Deemed BI-RADS 3\par

## 2021-10-04 NOTE — PHYSICAL EXAM
[Normocephalic] : normocephalic [Atraumatic] : atraumatic [EOMI] : extra ocular movement intact [Examined in the supine and seated position] : examined in the supine and seated position [No dominant masses] : no dominant masses left breast [No Nipple Retraction] : no left nipple retraction [No Nipple Discharge] : no left nipple discharge [No Axillary Lymphadenopathy] : no left axillary lymphadenopathy [No Edema] : no edema [No Rashes] : no rashes [No Ulceration] : no ulceration [de-identified] : On physical exam, there are no discrete masses in LEFT breast or axilla. There is no nipple discharge or inversion noted. There are no skin changes bilaterally.\par \par

## 2021-10-20 ENCOUNTER — APPOINTMENT (OUTPATIENT)
Dept: HEMATOLOGY ONCOLOGY | Facility: CLINIC | Age: 76
End: 2021-10-20
Payer: MEDICARE

## 2021-10-20 ENCOUNTER — APPOINTMENT (OUTPATIENT)
Dept: INFUSION THERAPY | Facility: CLINIC | Age: 76
End: 2021-10-20
Payer: MEDICARE

## 2021-10-20 ENCOUNTER — LABORATORY RESULT (OUTPATIENT)
Age: 76
End: 2021-10-20

## 2021-10-20 VITALS
DIASTOLIC BLOOD PRESSURE: 96 MMHG | OXYGEN SATURATION: 99 % | TEMPERATURE: 96.6 F | SYSTOLIC BLOOD PRESSURE: 166 MMHG | RESPIRATION RATE: 15 BRPM | HEIGHT: 61 IN | WEIGHT: 119 LBS | BODY MASS INDEX: 22.47 KG/M2 | HEART RATE: 67 BPM

## 2021-10-20 PROCEDURE — 99214 OFFICE O/P EST MOD 30 MIN: CPT

## 2021-10-20 RX ORDER — DIPHENHYDRAMINE HCL 50 MG
25 CAPSULE ORAL ONCE
Refills: 0 | Status: COMPLETED | OUTPATIENT
Start: 2021-10-20 | End: 2021-10-20

## 2021-10-20 RX ORDER — ACETAMINOPHEN 500 MG
650 TABLET ORAL ONCE
Refills: 0 | Status: COMPLETED | OUTPATIENT
Start: 2021-10-20 | End: 2021-10-20

## 2021-10-20 RX ORDER — TRASTUZUMAB-DKST 420 MG/20ML
325 INJECTION, POWDER, LYOPHILIZED, FOR SOLUTION INTRAVENOUS ONCE
Refills: 0 | Status: COMPLETED | OUTPATIENT
Start: 2021-10-20 | End: 2021-10-20

## 2021-10-20 RX ADMIN — Medication 101 MILLIGRAM(S): at 10:52

## 2021-10-20 RX ADMIN — TRASTUZUMAB-DKST 530.94 MILLIGRAM(S): 420 INJECTION, POWDER, LYOPHILIZED, FOR SOLUTION INTRAVENOUS at 11:33

## 2021-10-20 RX ADMIN — Medication 650 MILLIGRAM(S): at 10:52

## 2021-10-20 RX ADMIN — TRASTUZUMAB-DKST 325 MILLIGRAM(S): 420 INJECTION, POWDER, LYOPHILIZED, FOR SOLUTION INTRAVENOUS at 12:05

## 2021-10-20 RX ADMIN — Medication 25 MILLIGRAM(S): at 11:12

## 2021-10-20 NOTE — REVIEW OF SYSTEMS
[Fatigue] : fatigue [Insomnia] : insomnia [Negative] : Heme/Lymph [FreeTextEntry2] : anxious, at times feels depressed [de-identified] : occasional insomnia is relieved with melatonin

## 2021-10-20 NOTE — PHYSICAL EXAM
[Fully active, able to carry on all pre-disease performance without restriction] : Status 0 - Fully active, able to carry on all pre-disease performance without restriction [Normal] : affect appropriate [de-identified] : well-appearing [de-identified] : Status post right breast simple mastectomy and SLNB. Surgical incision is healing well with residual tissue swelling. There is no palpable abnormality in the left breast.

## 2021-10-20 NOTE — HISTORY OF PRESENT ILLNESS
[de-identified] : 73 yo female is referred by Dr. 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 Patient was originally diagnosed with [id number] (4 mm, moderately differentiated) invasive ductal carcinoma of R breast, %, [place] 25% in 2014.  She had lumpectomy and IORT.  She saw Dr. Wilkes and was started on anastrazole, switched to letrozole, and a third treatment she cannot recall.  She was not able to tolerate any of the medications for longer than about one month at a time.  She experienced severe insomnia, anxiety, night sweats, and unintentional weight loss.  She then stopped endocrine therapy.  She followed regularly with breast surgery and was compliant with screening mammograms.\par \par In 10/2019, screening mammogram detected R breast abnormality.  Diagnostic mammogram revealed R breast mass 6 mm 9:00 N3 (S-shape).  Biopsy revealed intermediate grade DCIS, ER %, [place] %.  On 1/8/2020, patient underwent lumpectomy with Dr. 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  DCISion RT score was 3.7 (low risk).  Patient underwent re-excision on 1/27/2020 with negative margins.  She will forgo adjuvant breast radiotherapy.\par \par She now presents for discussion of adjuvant endocrine therapy. Patient indicates that secondary to severe side effects in the past, she is not interested in taking endocrine therapy.  She recovered well from surgery. [de-identified] : 5/26/21:\opal Guillen is here for followup visit. She was last time seen in 2/2020. She has h/o stage IA ( [id number]) HR -positive R-sided breast cancer s/p lumpectomy and IORT in 2014, unable to tolerate endocrine therapies. She had recurrent DCIS in the right and underwent lumpectomy on 1/8/2020, low to intermediate grade. DCISion RT score was 3.7 (low risk). She underwent re-excision on 1/27/2020 with negative margins. She did not have adjuvant breast radiotherapy. She also declined adjuvant endocrine therapy. On 1/22/21, she had b/l dx mammo and US which showed 2 lesions in the right breast. Biopsy revealed IDC, ER/HI positive and Her-2 positive (FISH 2.56). On 4/28/21, she had right breast simple mastectomy and SLNB. The pathology revealed 1.5 cm invasive moderately differentiated ductal carcinoma, ER pos 100%, HI pos 15%, Her-2 pos (FISH ratio 2.5), Ki 67 50%, ductal carcinoma in-situ (DCIS), micropapillary and papillary types with comedo necrosis, intermediate nuclear grade. No lymphovascular invasion is seen. One SLN is negative. AJCC 8th Edition Pathologic Stage: pT1c, p(sn)N0, pMx.\opal Guillen recovered well from surgery. She is here today to discuss adjuvant therapy. \par \par 7/29/21:shanthi Guillen is here for followup visit and treatment. She has stage IA ([id number]) IDC of the right breast, G2, triple positive, s/p simple mastectomy and SLBN. She started on adjuvant endocrine therapy with Letrozole since 6/2021 and Herceptin in 7/2021. Due to her age, she opted not to have chemotherapy. She reports tolerating treatment well. \par 2D- ECHO from 6/11/21 showed  Normal global left ventricular systolic function. LV Ejection Fraction by Wells's Method with a biplane EF of 60 %.\par \par \par 9/8/21:shanthi Guillen is here for followup visit and treatment. She has stage IA ([id number]) IDC of the right breast, G2, triple positive, s/p simple mastectomy and SLBN. She started on adjuvant endocrine therapy with Letrozole since 6/2021 and Herceptin in 7/2021. Due to her age, she opted not to have chemotherapy. She reports tolerating treatment well. \par 2D- ECHO from 6/11/21 showed  Normal global left ventricular systolic function. LV Ejection Fraction by Wells's Method with a biplane EF of 60 %.  ECHO should be repeated this month. She is status post 3 doses so far, tolerating well.  Scheduled for 4th dose tomorrow. She admits to some degree of fatigue and some level of anxiety.  She feels more nervous than usual.  Her appetite is good.\par \par 10/20/2021\opal Guillen is here for followup visit and treatment. She has stage IA ([id number]) IDC of the right breast, G2, triple positive, s/p simple mastectomy and SLBN. She started on adjuvant endocrine therapy with Letrozole since 6/2021 and Herceptin in 7/2021. Due to her age, she opted not to have chemotherapy. She reports tolerating treatment well. \par 2D- ECHO from 9/21 showed EF of 65%. She is status post 3 doses so far, tolerating well.  Scheduled for 4th dose tomorrow. She admits to some degree of fatigue and some level of anxiety.  She feels more nervous than usual.  Her appetite is good. She fell down 2 weeks ago due to pulling from her dog. She had injury to her left knee, arm and face. \par \par

## 2021-10-20 NOTE — ASSESSMENT
[FreeTextEntry1] : 1. Newly diagnosed stage IA ([id number]) IDC of the right breast, G2, triple positive, s/p simple mastectomy and SLBN\par 2. H/O right breast HR positive DCIS, s/p lumpectomy in 1/2020.\par 3. H/O Stage IA ([id number]) IDC of the right breast ER/UT positive s/p lumpectomy and IORT in 2014, unable to tolerate endocrine therapies.\par \par Recommendation:\par -- Continue Letrozole daily. \par -- Proceed with 4th dose of Herceptin tomorrow and every 3 weeks. \par -- Bone density 5/28/21 showed osteopenia. Continue calcium and vitamin D supplement. \par -- Breast exam today did not reveal palpable abnormality. She will continue screening mammo of left breast annually.\par -- 2D Echo in 9/2021 showed normal LVEF. Will monitor every 3 months. \par --mastectomy bra with prosthesis provided\par -- RTO for followup in 6 weeks.\par \par

## 2021-11-10 ENCOUNTER — LABORATORY RESULT (OUTPATIENT)
Age: 76
End: 2021-11-10

## 2021-11-10 ENCOUNTER — APPOINTMENT (OUTPATIENT)
Dept: INFUSION THERAPY | Facility: CLINIC | Age: 76
End: 2021-11-10

## 2021-11-10 RX ORDER — ACETAMINOPHEN 500 MG
650 TABLET ORAL ONCE
Refills: 0 | Status: COMPLETED | OUTPATIENT
Start: 2021-11-10 | End: 2021-11-10

## 2021-11-10 RX ORDER — DIPHENHYDRAMINE HCL 50 MG
25 CAPSULE ORAL ONCE
Refills: 0 | Status: COMPLETED | OUTPATIENT
Start: 2021-11-10 | End: 2021-11-10

## 2021-11-10 RX ORDER — TRASTUZUMAB-DKST 420 MG/20ML
325 INJECTION, POWDER, LYOPHILIZED, FOR SOLUTION INTRAVENOUS ONCE
Refills: 0 | Status: COMPLETED | OUTPATIENT
Start: 2021-11-10 | End: 2021-11-10

## 2021-11-10 RX ADMIN — Medication 650 MILLIGRAM(S): at 09:34

## 2021-11-10 RX ADMIN — TRASTUZUMAB-DKST 325 MILLIGRAM(S): 420 INJECTION, POWDER, LYOPHILIZED, FOR SOLUTION INTRAVENOUS at 11:15

## 2021-11-10 RX ADMIN — Medication 25 MILLIGRAM(S): at 09:50

## 2021-11-10 RX ADMIN — Medication 101 MILLIGRAM(S): at 09:35

## 2021-11-10 RX ADMIN — TRASTUZUMAB-DKST 530.94 MILLIGRAM(S): 420 INJECTION, POWDER, LYOPHILIZED, FOR SOLUTION INTRAVENOUS at 10:45

## 2021-12-01 ENCOUNTER — APPOINTMENT (OUTPATIENT)
Dept: HEMATOLOGY ONCOLOGY | Facility: CLINIC | Age: 76
End: 2021-12-01
Payer: MEDICARE

## 2021-12-01 ENCOUNTER — APPOINTMENT (OUTPATIENT)
Dept: INFUSION THERAPY | Facility: CLINIC | Age: 76
End: 2021-12-01

## 2021-12-01 ENCOUNTER — LABORATORY RESULT (OUTPATIENT)
Age: 76
End: 2021-12-01

## 2021-12-01 VITALS
RESPIRATION RATE: 15 BRPM | BODY MASS INDEX: 22.28 KG/M2 | TEMPERATURE: 96.7 F | HEART RATE: 67 BPM | SYSTOLIC BLOOD PRESSURE: 157 MMHG | DIASTOLIC BLOOD PRESSURE: 71 MMHG | HEIGHT: 61 IN | OXYGEN SATURATION: 98 % | WEIGHT: 118 LBS

## 2021-12-01 PROCEDURE — 99214 OFFICE O/P EST MOD 30 MIN: CPT

## 2021-12-01 RX ORDER — ACETAMINOPHEN 500 MG
650 TABLET ORAL ONCE
Refills: 0 | Status: COMPLETED | OUTPATIENT
Start: 2021-12-01 | End: 2021-12-01

## 2021-12-01 RX ORDER — TRASTUZUMAB-DKST 420 MG/20ML
325 INJECTION, POWDER, LYOPHILIZED, FOR SOLUTION INTRAVENOUS ONCE
Refills: 0 | Status: COMPLETED | OUTPATIENT
Start: 2021-12-01 | End: 2021-12-01

## 2021-12-01 RX ORDER — DIPHENHYDRAMINE HCL 50 MG
25 CAPSULE ORAL ONCE
Refills: 0 | Status: COMPLETED | OUTPATIENT
Start: 2021-12-01 | End: 2021-12-01

## 2021-12-01 RX ADMIN — TRASTUZUMAB-DKST 530.94 MILLIGRAM(S): 420 INJECTION, POWDER, LYOPHILIZED, FOR SOLUTION INTRAVENOUS at 12:43

## 2021-12-01 RX ADMIN — Medication 101 MILLIGRAM(S): at 12:17

## 2021-12-01 RX ADMIN — Medication 650 MILLIGRAM(S): at 12:16

## 2021-12-08 NOTE — REVIEW OF SYSTEMS
[Fatigue] : fatigue [Insomnia] : insomnia [Negative] : Heme/Lymph [FreeTextEntry2] : anxious, at times feels depressed [de-identified] : occasional insomnia is relieved with melatonin

## 2021-12-08 NOTE — PHYSICAL EXAM
[Fully active, able to carry on all pre-disease performance without restriction] : Status 0 - Fully active, able to carry on all pre-disease performance without restriction [Normal] : affect appropriate [de-identified] : well-appearing [de-identified] : Status post right breast simple mastectomy and SLNB. Surgical incision is healing well with residual tissue swelling. There is no palpable abnormality in the left breast.

## 2021-12-08 NOTE — HISTORY OF PRESENT ILLNESS
[de-identified] : 73 yo female is referred by Dr. 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 Patient was originally diagnosed with [id number] (4 mm, moderately differentiated) invasive ductal carcinoma of R breast, %, CT 25% in 2014.  She had lumpectomy and IORT.  She saw Dr. Wilkes and was started on anastrazole, switched to letrozole, and a third treatment she cannot recall.  She was not able to tolerate any of the medications for longer than about one month at a time.  She experienced severe insomnia, anxiety, night sweats, and unintentional weight loss.  She then stopped endocrine therapy.  She followed regularly with breast surgery and was compliant with screening mammograms.\par \par In 10/2019, screening mammogram detected R breast abnormality.  Diagnostic mammogram revealed R breast mass 6 mm 9:00 N3 (S-shape).  Biopsy revealed intermediate grade DCIS, ER %, CT %.  On 1/8/2020, patient underwent lumpectomy with Dr. 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  DCISion RT score was 3.7 (low risk).  Patient underwent re-excision on 1/27/2020 with negative margins.  She will forgo adjuvant breast radiotherapy.\par \par She now presents for discussion of adjuvant endocrine therapy. Patient indicates that secondary to severe side effects in the past, she is not interested in taking endocrine therapy.  She recovered well from surgery. [de-identified] : 5/26/21:\opal Guillen is here for followup visit. She was last time seen in 2/2020. She has h/o stage IA ( [id number]) HR -positive R-sided breast cancer s/p lumpectomy and IORT in 2014, unable to tolerate endocrine therapies. She had recurrent DCIS in the right and underwent lumpectomy on 1/8/2020, low to intermediate grade. DCISion RT score was 3.7 (low risk). She underwent re-excision on 1/27/2020 with negative margins. She did not have adjuvant breast radiotherapy. She also declined adjuvant endocrine therapy. On 1/22/21, she had b/l dx mammo and US which showed 2 lesions in the right breast. Biopsy revealed IDC, ER/SC positive and Her-2 positive (FISH 2.56). On 4/28/21, she had right breast simple mastectomy and SLNB. The pathology revealed 1.5 cm invasive moderately differentiated ductal carcinoma, ER pos 100%, SC pos 15%, Her-2 pos (FISH ratio 2.5), Ki 67 50%, ductal carcinoma in-situ (DCIS), micropapillary and papillary types with comedo necrosis, intermediate nuclear grade. No lymphovascular invasion is seen. One SLN is negative. AJCC 8th Edition Pathologic Stage: pT1c, p(sn)N0, pMx.\opal Guillen recovered well from surgery. She is here today to discuss adjuvant therapy. \par \par 7/29/21:shanthi Guillen is here for followup visit and treatment. She has stage IA ([id number]) IDC of the right breast, G2, triple positive, s/p simple mastectomy and SLBN. She started on adjuvant endocrine therapy with Letrozole since 6/2021 and Herceptin in 7/2021. Due to her age, she opted not to have chemotherapy. She reports tolerating treatment well. \par 2D- ECHO from 6/11/21 showed  Normal global left ventricular systolic function. LV Ejection Fraction by Wells's Method with a biplane EF of 60 %.\par \par \par 9/8/21:shanthi Guillen is here for followup visit and treatment. She has stage IA ([id number]) IDC of the right breast, G2, triple positive, s/p simple mastectomy and SLBN. She started on adjuvant endocrine therapy with Letrozole since 6/2021 and Herceptin in 7/2021. Due to her age, she opted not to have chemotherapy. She reports tolerating treatment well. \par 2D- ECHO from 6/11/21 showed  Normal global left ventricular systolic function. LV Ejection Fraction by Wells's Method with a biplane EF of 60 %.  ECHO should be repeated this month. She is status post 3 doses so far, tolerating well.  Scheduled for 4th dose tomorrow. She admits to some degree of fatigue and some level of anxiety.  She feels more nervous than usual.  Her appetite is good.\par \par 10/20/2021shanthi Guillen is here for followup visit and treatment. She has stage IA ([id number]) IDC of the right breast, G2, triple positive, s/p simple mastectomy and SLBN. She started on adjuvant endocrine therapy with Letrozole since 6/2021 and Herceptin in 7/2021. Due to her age, she opted not to have chemotherapy. She reports tolerating treatment well. \par 2D- ECHO from 9/21 showed EF of 65%. She is status post 3 doses so far, tolerating well.  Scheduled for 4th dose tomorrow. She admits to some degree of fatigue and some level of anxiety.  She feels more nervous than usual.  Her appetite is good. She fell down 2 weeks ago due to pulling from her dog. She had injury to her left knee, arm and face. \par \par 12/1/21shanhti Guillen is here for followup visit and treatment. She has stage IA ([id number]) IDC of the right breast, G2, triple positive, s/p simple mastectomy and SLBN. She started on adjuvant endocrine therapy with Letrozole since 6/2021 and Herceptin in 7/2021. Due to her age, she opted not to have chemotherapy. She reports feeling some arthralgia and stiffness. \par 2D- ECHO from 9/21 showed EF of 65%. She fell down a few weeks ago due to pulling from her dog. She had injury to her left knee which has recovered.

## 2021-12-08 NOTE — ASSESSMENT
[FreeTextEntry1] : 1. Newly diagnosed stage IA ([id number]) IDC of the right breast, G2, triple positive, s/p simple mastectomy and SLBN\par 2. H/O right breast HR positive DCIS, s/p lumpectomy in 1/2020.\par 3. H/O Stage IA (xC6yC8H2) IDC of the right breast ER/CA positive s/p lumpectomy and IORT in 2014, unable to tolerate endocrine therapies.\par \par Recommendation:\par -- Continue Letrozole daily. \par -- Proceed with 9th dose of Herceptin. \par -- Bone density 5/28/21 showed osteopenia. Continue calcium and vitamin D supplement. \par -- Breast exam today did not reveal palpable abnormality. She will have left breast screening mammo in 1/2022. A referral was given to her. \par -- 2D Echo in 9/2021 showed normal LVEF. Will monitor every 3 months. \par -- RTO for followup in 6 weeks.\par \par

## 2021-12-22 ENCOUNTER — APPOINTMENT (OUTPATIENT)
Dept: INFUSION THERAPY | Facility: CLINIC | Age: 76
End: 2021-12-22

## 2021-12-22 ENCOUNTER — LABORATORY RESULT (OUTPATIENT)
Age: 76
End: 2021-12-22

## 2021-12-22 RX ORDER — TRASTUZUMAB-DKST 420 MG/20ML
325 INJECTION, POWDER, LYOPHILIZED, FOR SOLUTION INTRAVENOUS ONCE
Refills: 0 | Status: COMPLETED | OUTPATIENT
Start: 2021-12-22 | End: 2021-12-22

## 2021-12-22 RX ORDER — DIPHENHYDRAMINE HCL 50 MG
25 CAPSULE ORAL ONCE
Refills: 0 | Status: COMPLETED | OUTPATIENT
Start: 2021-12-22 | End: 2021-12-22

## 2021-12-22 RX ORDER — ACETAMINOPHEN 500 MG
650 TABLET ORAL ONCE
Refills: 0 | Status: COMPLETED | OUTPATIENT
Start: 2021-12-22 | End: 2021-12-22

## 2021-12-22 RX ADMIN — TRASTUZUMAB-DKST 325 MILLIGRAM(S): 420 INJECTION, POWDER, LYOPHILIZED, FOR SOLUTION INTRAVENOUS at 16:51

## 2021-12-22 RX ADMIN — Medication 650 MILLIGRAM(S): at 16:00

## 2021-12-22 RX ADMIN — Medication 25 MILLIGRAM(S): at 16:14

## 2021-12-22 RX ADMIN — TRASTUZUMAB-DKST 530.94 MILLIGRAM(S): 420 INJECTION, POWDER, LYOPHILIZED, FOR SOLUTION INTRAVENOUS at 16:21

## 2021-12-22 RX ADMIN — Medication 101 MILLIGRAM(S): at 15:59

## 2022-01-05 ENCOUNTER — NON-APPOINTMENT (OUTPATIENT)
Age: 77
End: 2022-01-05

## 2022-01-12 ENCOUNTER — APPOINTMENT (OUTPATIENT)
Dept: INFUSION THERAPY | Facility: CLINIC | Age: 77
End: 2022-01-12
Payer: MEDICARE

## 2022-01-12 ENCOUNTER — APPOINTMENT (OUTPATIENT)
Dept: HEMATOLOGY ONCOLOGY | Facility: CLINIC | Age: 77
End: 2022-01-12
Payer: MEDICARE

## 2022-01-12 ENCOUNTER — LABORATORY RESULT (OUTPATIENT)
Age: 77
End: 2022-01-12

## 2022-01-12 VITALS
DIASTOLIC BLOOD PRESSURE: 79 MMHG | SYSTOLIC BLOOD PRESSURE: 152 MMHG | WEIGHT: 118 LBS | BODY MASS INDEX: 22.28 KG/M2 | OXYGEN SATURATION: 98 % | HEART RATE: 75 BPM | TEMPERATURE: 98.7 F | RESPIRATION RATE: 15 BRPM | HEIGHT: 61 IN

## 2022-01-12 DIAGNOSIS — Z01.818 ENCOUNTER FOR OTHER PREPROCEDURAL EXAMINATION: ICD-10-CM

## 2022-01-12 LAB
ALBUMIN SERPL ELPH-MCNC: 4.5 G/DL
ALP BLD-CCNC: 99 U/L
ALT SERPL-CCNC: 25 U/L
ANION GAP SERPL CALC-SCNC: 14 MMOL/L
AST SERPL-CCNC: 37 U/L
BILIRUB DIRECT SERPL-MCNC: <0.2 MG/DL
BILIRUB INDIRECT SERPL-MCNC: >0 MG/DL
BILIRUB SERPL-MCNC: 0.2 MG/DL
BUN SERPL-MCNC: 11 MG/DL
CALCIUM SERPL-MCNC: 9.6 MG/DL
CHLORIDE SERPL-SCNC: 100 MMOL/L
CO2 SERPL-SCNC: 19 MMOL/L
CREAT SERPL-MCNC: 0.7 MG/DL
GLUCOSE SERPL-MCNC: 83 MG/DL
HCT VFR BLD CALC: 35.6 %
HCT VFR BLD CALC: 37.7 %
HGB BLD-MCNC: 12.2 G/DL
HGB BLD-MCNC: 13 G/DL
MCHC RBC-ENTMCNC: 32.5 PG
MCHC RBC-ENTMCNC: 32.5 PG
MCHC RBC-ENTMCNC: 34.3 G/DL
MCHC RBC-ENTMCNC: 34.5 G/DL
MCV RBC AUTO: 94.3 FL
MCV RBC AUTO: 94.9 FL
PLATELET # BLD AUTO: 266 K/UL
PLATELET # BLD AUTO: 342 K/UL
PMV BLD: 10.4 FL
PMV BLD: 10.9 FL
POTASSIUM SERPL-SCNC: 5.4 MMOL/L
PROT SERPL-MCNC: 7.4 G/DL
RBC # BLD: 3.75 M/UL
RBC # BLD: 4 M/UL
RBC # FLD: 12.6 %
RBC # FLD: 12.7 %
SODIUM SERPL-SCNC: 133 MMOL/L
WBC # FLD AUTO: 6.65 K/UL
WBC # FLD AUTO: 9.82 K/UL

## 2022-01-12 PROCEDURE — 99214 OFFICE O/P EST MOD 30 MIN: CPT

## 2022-01-12 RX ORDER — TRASTUZUMAB-DKST 420 MG/20ML
325 INJECTION, POWDER, LYOPHILIZED, FOR SOLUTION INTRAVENOUS ONCE
Refills: 0 | Status: COMPLETED | OUTPATIENT
Start: 2022-01-12 | End: 2022-01-12

## 2022-01-12 RX ORDER — DIPHENHYDRAMINE HCL 50 MG
25 CAPSULE ORAL ONCE
Refills: 0 | Status: COMPLETED | OUTPATIENT
Start: 2022-01-12 | End: 2022-01-12

## 2022-01-12 RX ORDER — ACETAMINOPHEN 500 MG
650 TABLET ORAL ONCE
Refills: 0 | Status: COMPLETED | OUTPATIENT
Start: 2022-01-12 | End: 2022-01-12

## 2022-01-12 RX ADMIN — TRASTUZUMAB-DKST 530.94 MILLIGRAM(S): 420 INJECTION, POWDER, LYOPHILIZED, FOR SOLUTION INTRAVENOUS at 16:27

## 2022-01-12 RX ADMIN — Medication 650 MILLIGRAM(S): at 16:11

## 2022-01-12 RX ADMIN — Medication 101 MILLIGRAM(S): at 16:11

## 2022-01-14 ENCOUNTER — LABORATORY RESULT (OUTPATIENT)
Age: 77
End: 2022-01-14

## 2022-01-15 PROBLEM — Z01.818 EXAMINATION PRIOR TO CHEMOTHERAPY: Status: ACTIVE | Noted: 2022-01-12

## 2022-01-15 NOTE — HISTORY OF PRESENT ILLNESS
[de-identified] : 75 yo female is referred by Dr. 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 Patient was originally diagnosed with [id number] (4 mm, moderately differentiated) invasive ductal carcinoma of R breast, %, [place] 25% in 2014.  She had lumpectomy and IORT.  She saw Dr. Wilkes and was started on anastrazole, switched to letrozole, and a third treatment she cannot recall.  She was not able to tolerate any of the medications for longer than about one month at a time.  She experienced severe insomnia, anxiety, night sweats, and unintentional weight loss.  She then stopped endocrine therapy.  She followed regularly with breast surgery and was compliant with screening mammograms.\par \par In 10/2019, screening mammogram detected R breast abnormality.  Diagnostic mammogram revealed R breast mass 6 mm 9:00 N3 (S-shape).  Biopsy revealed intermediate grade DCIS, ER %, [place] %.  On 1/8/2020, patient underwent lumpectomy with Dr. 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  DCISion RT score was 3.7 (low risk).  Patient underwent re-excision on 1/27/2020 with negative margins.  She will forgo adjuvant breast radiotherapy.\par \par She now presents for discussion of adjuvant endocrine therapy. Patient indicates that secondary to severe side effects in the past, she is not interested in taking endocrine therapy.  She recovered well from surgery. [de-identified] : 5/26/21:\opal Guillen is here for followup visit. She was last time seen in 2/2020. She has h/o stage IA ( [id number]) HR -positive R-sided breast cancer s/p lumpectomy and IORT in 2014, unable to tolerate endocrine therapies. She had recurrent DCIS in the right and underwent lumpectomy on 1/8/2020, low to intermediate grade. DCISion RT score was 3.7 (low risk). She underwent re-excision on 1/27/2020 with negative margins. She did not have adjuvant breast radiotherapy. She also declined adjuvant endocrine therapy. On 1/22/21, she had b/l dx mammo and US which showed 2 lesions in the right breast. Biopsy revealed IDC, ER/NH positive and Her-2 positive (FISH 2.56). On 4/28/21, she had right breast simple mastectomy and SLNB. The pathology revealed 1.5 cm invasive moderately differentiated ductal carcinoma, ER pos 100%, NH pos 15%, Her-2 pos (FISH ratio 2.5), Ki 67 50%, ductal carcinoma in-situ (DCIS), micropapillary and papillary types with comedo necrosis, intermediate nuclear grade. No lymphovascular invasion is seen. One SLN is negative. AJCC 8th Edition Pathologic Stage: pT1c, p(sn)N0, pMx.\opal Guillen recovered well from surgery. She is here today to discuss adjuvant therapy. \par \par 7/29/21:\opal Guillen is here for followup visit and treatment. She has stage IA ([id number]) IDC of the right breast, G2, triple positive, s/p simple mastectomy and SLBN. She started on adjuvant endocrine therapy with Letrozole since 6/2021 and Herceptin in 7/2021. Due to her age, she opted not to have chemotherapy. She reports tolerating treatment well. \par 2D- ECHO from 6/11/21 showed  Normal global left ventricular systolic function. LV Ejection Fraction by Wells's Method with a biplane EF of 60 %.\par \par 9/8/21:\opal Guillen is here for followup visit and treatment. She has stage IA ([id number]) IDC of the right breast, G2, triple positive, s/p simple mastectomy and SLBN. She started on adjuvant endocrine therapy with Letrozole since 6/2021 and Herceptin in 7/2021. Due to her age, she opted not to have chemotherapy. She reports tolerating treatment well. \par 2D- ECHO from 6/11/21 showed  Normal global left ventricular systolic function. LV Ejection Fraction by Wells's Method with a biplane EF of 60 %.  ECHO should be repeated this month. She is status post 3 doses so far, tolerating well.  Scheduled for 4th dose tomorrow. She admits to some degree of fatigue and some level of anxiety.  She feels more nervous than usual.  Her appetite is good.\par \par 10/20/2021shanthi Guillen is here for followup visit and treatment. She has stage IA ([id number]) IDC of the right breast, G2, triple positive, s/p simple mastectomy and SLBN. She started on adjuvant endocrine therapy with Letrozole since 6/2021 and Herceptin in 7/2021. Due to her age, she opted not to have chemotherapy. She reports tolerating treatment well. \par 2D- ECHO from 9/21 showed EF of 65%. She is status post 3 doses so far, tolerating well.  Scheduled for 4th dose tomorrow. She admits to some degree of fatigue and some level of anxiety.  She feels more nervous than usual.  Her appetite is good. She fell down 2 weeks ago due to pulling from her dog. She had injury to her left knee, arm and face. \par \par 12/1/21shanthi Guillen is here for followup visit and treatment. She has stage IA ([id number]) IDC of the right breast, G2, triple positive, s/p simple mastectomy and SLBN. She started on adjuvant endocrine therapy with Letrozole since 6/2021 and Herceptin in 7/2021. Due to her age, she opted not to have chemotherapy. She reports feeling some arthralgia and stiffness. \par 2D- ECHO from 9/21 showed EF of 65%. She fell down a few weeks ago due to pulling from her dog. She had injury to her left knee which has recovered.\par \par 1/12/22shanthi Guillen is here for followup visit and treatment. She has stage IA (eL3lW3C4) IDC of the right breast, G2, triple positive, s/p simple mastectomy and SLBN. She started on adjuvant endocrine therapy with Letrozole since 6/2021 and Herceptin in 7/2021. Due to her age, she opted not to have chemotherapy. She reports feeling some arthralgia, difficult sleeping, anxiety, and mouth sores x 3 months. She was taking a medication for mouth sores, PMD rx and sores subsided. 2D- ECHO from 9/21 showed EF of 65%. She had left breast mammogram and US on 1/3/22 which showed stable nodule in left breast at 2:00 at 4 cm from nipple.

## 2022-01-15 NOTE — ASSESSMENT
[FreeTextEntry1] : 1. Newly diagnosed stage IA (yX4oB2W0) IDC of the right breast, G2, triple positive, s/p simple mastectomy and SLBN\par 2. H/O right breast HR positive DCIS, s/p lumpectomy in 1/2020.\par 3. H/O Stage IA (gO4eJ5R2) IDC of the right breast ER/AL positive s/p lumpectomy and IORT in 2014, unable to tolerate endocrine therapies.\par \par Recommendation:\par -- Continue Letrozole daily. \par -- Proceed with 10th dose of Herceptin. \par -- Bone density 5/28/21 showed osteopenia. Continue calcium and vitamin D supplement. \par -- Breast exam today did not reveal palpable abnormality. She had left breast screening mammo and US on 1/3/2022.\par -- 2D Echo in 9/2021 showed normal LVEF. Will monitor every 3 months. Referral provided today. \par -- RTO for followup in 6 weeks.\par \par Case was seen and discussed with Dr. Garcia who agreed with the assessment and plan.\par

## 2022-01-15 NOTE — PHYSICAL EXAM
[Fully active, able to carry on all pre-disease performance without restriction] : Status 0 - Fully active, able to carry on all pre-disease performance without restriction [Normal] : affect appropriate [de-identified] : well-appearing [de-identified] : Status post right breast simple mastectomy and SLNB. Surgical incision is healing well with residual tissue swelling. There is no palpable abnormality in the left breast.

## 2022-01-15 NOTE — REVIEW OF SYSTEMS
[Fatigue] : fatigue [Insomnia] : insomnia [Negative] : Heme/Lymph [FreeTextEntry2] : anxious, at times feels depressed [de-identified] : occasional insomnia is relieved with melatonin

## 2022-01-16 ENCOUNTER — FORM ENCOUNTER (OUTPATIENT)
Age: 77
End: 2022-01-16

## 2022-01-17 ENCOUNTER — OUTPATIENT (OUTPATIENT)
Dept: OUTPATIENT SERVICES | Facility: HOSPITAL | Age: 77
LOS: 1 days | Discharge: HOME | End: 2022-01-17
Payer: MEDICARE

## 2022-01-17 DIAGNOSIS — Z01.818 ENCOUNTER FOR OTHER PREPROCEDURAL EXAMINATION: ICD-10-CM

## 2022-01-17 DIAGNOSIS — Z98.890 OTHER SPECIFIED POSTPROCEDURAL STATES: Chronic | ICD-10-CM

## 2022-01-17 PROCEDURE — 93306 TTE W/DOPPLER COMPLETE: CPT | Mod: 26

## 2022-02-02 ENCOUNTER — OUTPATIENT (OUTPATIENT)
Dept: OUTPATIENT SERVICES | Facility: HOSPITAL | Age: 77
LOS: 1 days | Discharge: HOME | End: 2022-02-02

## 2022-02-02 ENCOUNTER — LABORATORY RESULT (OUTPATIENT)
Age: 77
End: 2022-02-02

## 2022-02-02 ENCOUNTER — APPOINTMENT (OUTPATIENT)
Dept: INFUSION THERAPY | Facility: CLINIC | Age: 77
End: 2022-02-02

## 2022-02-02 DIAGNOSIS — D05.11 INTRADUCTAL CARCINOMA IN SITU OF RIGHT BREAST: ICD-10-CM

## 2022-02-02 DIAGNOSIS — Z98.890 OTHER SPECIFIED POSTPROCEDURAL STATES: Chronic | ICD-10-CM

## 2022-02-02 DIAGNOSIS — C50.411 MALIGNANT NEOPLASM OF UPPER-OUTER QUADRANT OF RIGHT FEMALE BREAST: ICD-10-CM

## 2022-02-02 DIAGNOSIS — Z51.81 ENCOUNTER FOR THERAPEUTIC DRUG LEVEL MONITORING: ICD-10-CM

## 2022-02-02 DIAGNOSIS — Z51.11 ENCOUNTER FOR ANTINEOPLASTIC CHEMOTHERAPY: ICD-10-CM

## 2022-02-02 RX ORDER — DIPHENHYDRAMINE HCL 50 MG
25 CAPSULE ORAL ONCE
Refills: 0 | Status: COMPLETED | OUTPATIENT
Start: 2022-02-02 | End: 2022-02-02

## 2022-02-02 RX ORDER — TRASTUZUMAB-DKST 420 MG/20ML
325 INJECTION, POWDER, LYOPHILIZED, FOR SOLUTION INTRAVENOUS ONCE
Refills: 0 | Status: COMPLETED | OUTPATIENT
Start: 2022-02-02 | End: 2022-02-02

## 2022-02-02 RX ORDER — ACETAMINOPHEN 500 MG
650 TABLET ORAL ONCE
Refills: 0 | Status: COMPLETED | OUTPATIENT
Start: 2022-02-02 | End: 2022-02-02

## 2022-02-02 RX ADMIN — TRASTUZUMAB-DKST 325 MILLIGRAM(S): 420 INJECTION, POWDER, LYOPHILIZED, FOR SOLUTION INTRAVENOUS at 16:00

## 2022-02-02 RX ADMIN — Medication 650 MILLIGRAM(S): at 15:09

## 2022-02-02 RX ADMIN — Medication 650 MILLIGRAM(S): at 15:05

## 2022-02-02 RX ADMIN — Medication 101 MILLIGRAM(S): at 15:06

## 2022-02-02 RX ADMIN — TRASTUZUMAB-DKST 530.94 MILLIGRAM(S): 420 INJECTION, POWDER, LYOPHILIZED, FOR SOLUTION INTRAVENOUS at 15:26

## 2022-02-02 RX ADMIN — Medication 25 MILLIGRAM(S): at 15:30

## 2022-02-23 ENCOUNTER — APPOINTMENT (OUTPATIENT)
Dept: HEMATOLOGY ONCOLOGY | Facility: CLINIC | Age: 77
End: 2022-02-23
Payer: MEDICARE

## 2022-02-23 ENCOUNTER — LABORATORY RESULT (OUTPATIENT)
Age: 77
End: 2022-02-23

## 2022-02-23 ENCOUNTER — APPOINTMENT (OUTPATIENT)
Dept: INFUSION THERAPY | Facility: CLINIC | Age: 77
End: 2022-02-23
Payer: MEDICARE

## 2022-02-23 VITALS
TEMPERATURE: 97.3 F | HEART RATE: 73 BPM | WEIGHT: 119 LBS | BODY MASS INDEX: 22.47 KG/M2 | HEIGHT: 61 IN | RESPIRATION RATE: 15 BRPM | OXYGEN SATURATION: 98 % | DIASTOLIC BLOOD PRESSURE: 72 MMHG | SYSTOLIC BLOOD PRESSURE: 151 MMHG

## 2022-02-23 PROCEDURE — 99214 OFFICE O/P EST MOD 30 MIN: CPT

## 2022-02-23 RX ORDER — DIPHENHYDRAMINE HCL 50 MG
25 CAPSULE ORAL ONCE
Refills: 0 | Status: COMPLETED | OUTPATIENT
Start: 2022-02-23 | End: 2022-02-23

## 2022-02-23 RX ORDER — TRASTUZUMAB-DKST 420 MG/20ML
325 INJECTION, POWDER, LYOPHILIZED, FOR SOLUTION INTRAVENOUS ONCE
Refills: 0 | Status: COMPLETED | OUTPATIENT
Start: 2022-02-23 | End: 2022-02-23

## 2022-02-23 RX ORDER — ACETAMINOPHEN 500 MG
650 TABLET ORAL ONCE
Refills: 0 | Status: COMPLETED | OUTPATIENT
Start: 2022-02-23 | End: 2022-02-23

## 2022-02-23 RX ORDER — LETROZOLE TABLETS 2.5 MG/1
2.5 TABLET, FILM COATED ORAL DAILY
Qty: 90 | Refills: 2 | Status: ACTIVE | COMMUNITY
Start: 2021-05-26 | End: 1900-01-01

## 2022-02-23 RX ADMIN — Medication 101 MILLIGRAM(S): at 11:46

## 2022-02-23 RX ADMIN — TRASTUZUMAB-DKST 530.94 MILLIGRAM(S): 420 INJECTION, POWDER, LYOPHILIZED, FOR SOLUTION INTRAVENOUS at 12:13

## 2022-02-23 RX ADMIN — Medication 650 MILLIGRAM(S): at 11:47

## 2022-02-28 LAB
ALBUMIN SERPL ELPH-MCNC: 4.3 G/DL
ALBUMIN SERPL ELPH-MCNC: 4.5 G/DL
ALP BLD-CCNC: 82 U/L
ALP BLD-CCNC: 98 U/L
ALT SERPL-CCNC: 12 U/L
ALT SERPL-CCNC: 17 U/L
ANION GAP SERPL CALC-SCNC: 11 MMOL/L
ANION GAP SERPL CALC-SCNC: 15 MMOL/L
AST SERPL-CCNC: 22 U/L
AST SERPL-CCNC: 24 U/L
BILIRUB DIRECT SERPL-MCNC: <0.2 MG/DL
BILIRUB INDIRECT SERPL-MCNC: NORMAL MG/DL
BILIRUB SERPL-MCNC: 0.4 MG/DL
BILIRUB SERPL-MCNC: <0.2 MG/DL
BUN SERPL-MCNC: 13 MG/DL
BUN SERPL-MCNC: 13 MG/DL
CALCIUM SERPL-MCNC: 9.7 MG/DL
CALCIUM SERPL-MCNC: 9.9 MG/DL
CHLORIDE SERPL-SCNC: 100 MMOL/L
CHLORIDE SERPL-SCNC: 98 MMOL/L
CO2 SERPL-SCNC: 22 MMOL/L
CO2 SERPL-SCNC: 26 MMOL/L
CREAT SERPL-MCNC: 0.7 MG/DL
CREAT SERPL-MCNC: 0.8 MG/DL
GLUCOSE SERPL-MCNC: 73 MG/DL
GLUCOSE SERPL-MCNC: 83 MG/DL
HCT VFR BLD CALC: 36.5 %
HCT VFR BLD CALC: 36.5 %
HCT VFR BLD CALC: 36.7 %
HCT VFR BLD CALC: 36.8 %
HCT VFR BLD CALC: 36.8 %
HCT VFR BLD CALC: 37.2 %
HCT VFR BLD CALC: 37.3 %
HCT VFR BLD CALC: 37.9 %
HCT VFR BLD CALC: 38.3 %
HCT VFR BLD CALC: 40.9 %
HGB BLD-MCNC: 12.4 G/DL
HGB BLD-MCNC: 12.5 G/DL
HGB BLD-MCNC: 12.6 G/DL
HGB BLD-MCNC: 12.7 G/DL
HGB BLD-MCNC: 12.8 G/DL
HGB BLD-MCNC: 12.8 G/DL
HGB BLD-MCNC: 13.1 G/DL
HGB BLD-MCNC: 13.8 G/DL
MCHC RBC-ENTMCNC: 31.8 PG
MCHC RBC-ENTMCNC: 31.8 PG
MCHC RBC-ENTMCNC: 31.9 PG
MCHC RBC-ENTMCNC: 32 PG
MCHC RBC-ENTMCNC: 32.1 PG
MCHC RBC-ENTMCNC: 32.1 PG
MCHC RBC-ENTMCNC: 32.2 PG
MCHC RBC-ENTMCNC: 32.3 PG
MCHC RBC-ENTMCNC: 32.3 PG
MCHC RBC-ENTMCNC: 32.4 PG
MCHC RBC-ENTMCNC: 33.5 G/DL
MCHC RBC-ENTMCNC: 33.7 G/DL
MCHC RBC-ENTMCNC: 33.8 G/DL
MCHC RBC-ENTMCNC: 34 G/DL
MCHC RBC-ENTMCNC: 34.1 G/DL
MCHC RBC-ENTMCNC: 34.2 G/DL
MCHC RBC-ENTMCNC: 34.4 G/DL
MCHC RBC-ENTMCNC: 35.1 G/DL
MCV RBC AUTO: 92.2 FL
MCV RBC AUTO: 93.6 FL
MCV RBC AUTO: 93.9 FL
MCV RBC AUTO: 94.2 FL
MCV RBC AUTO: 94.2 FL
MCV RBC AUTO: 94.4 FL
MCV RBC AUTO: 94.7 FL
MCV RBC AUTO: 94.8 FL
MCV RBC AUTO: 94.8 FL
MCV RBC AUTO: 95.1 FL
PLATELET # BLD AUTO: 193 K/UL
PLATELET # BLD AUTO: 203 K/UL
PLATELET # BLD AUTO: 224 K/UL
PLATELET # BLD AUTO: 230 K/UL
PLATELET # BLD AUTO: 246 K/UL
PLATELET # BLD AUTO: 298 K/UL
PLATELET # BLD AUTO: 313 K/UL
PLATELET # BLD AUTO: 326 K/UL
PLATELET # BLD AUTO: 352 K/UL
PLATELET # BLD AUTO: 391 K/UL
PMV BLD: 10.1 FL
PMV BLD: 10.4 FL
PMV BLD: 10.6 FL
PMV BLD: 10.6 FL
PMV BLD: 10.7 FL
PMV BLD: 10.9 FL
PMV BLD: 11 FL
PMV BLD: 11 FL
PMV BLD: 9.8 FL
PMV BLD: 9.9 FL
POTASSIUM SERPL-SCNC: 4.4 MMOL/L
POTASSIUM SERPL-SCNC: 4.6 MMOL/L
PROT SERPL-MCNC: 7 G/DL
PROT SERPL-MCNC: 7.2 G/DL
RBC # BLD: 3.84 M/UL
RBC # BLD: 3.88 M/UL
RBC # BLD: 3.9 M/UL
RBC # BLD: 3.92 M/UL
RBC # BLD: 3.94 M/UL
RBC # BLD: 3.95 M/UL
RBC # BLD: 3.96 M/UL
RBC # BLD: 4 M/UL
RBC # BLD: 4.08 M/UL
RBC # BLD: 4.34 M/UL
RBC # FLD: 11.9 %
RBC # FLD: 12.3 %
RBC # FLD: 12.4 %
RBC # FLD: 12.5 %
RBC # FLD: 12.6 %
RBC # FLD: 12.7 %
RBC # FLD: 12.7 %
RBC # FLD: 12.8 %
SODIUM SERPL-SCNC: 135 MMOL/L
SODIUM SERPL-SCNC: 137 MMOL/L
WBC # FLD AUTO: 10.1 K/UL
WBC # FLD AUTO: 10.81 K/UL
WBC # FLD AUTO: 6.55 K/UL
WBC # FLD AUTO: 7.39 K/UL
WBC # FLD AUTO: 7.63 K/UL
WBC # FLD AUTO: 7.89 K/UL
WBC # FLD AUTO: 8.27 K/UL
WBC # FLD AUTO: 8.76 K/UL
WBC # FLD AUTO: 8.94 K/UL
WBC # FLD AUTO: 9.58 K/UL

## 2022-02-28 NOTE — REVIEW OF SYSTEMS
[Fatigue] : fatigue [Insomnia] : insomnia [Negative] : Heme/Lymph [FreeTextEntry2] : anxious, at times feels depressed [de-identified] : occasional insomnia is relieved with melatonin

## 2022-02-28 NOTE — PHYSICAL EXAM
[Fully active, able to carry on all pre-disease performance without restriction] : Status 0 - Fully active, able to carry on all pre-disease performance without restriction [Normal] : affect appropriate [de-identified] : well-appearing [de-identified] : Status post right breast simple mastectomy and SLNB. Surgical incision is healing well with residual tissue swelling. There is no palpable abnormality in the left breast.

## 2022-02-28 NOTE — HISTORY OF PRESENT ILLNESS
[de-identified] : 73 yo female is referred by Dr. 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 Patient was originally diagnosed with [id number] (4 mm, moderately differentiated) invasive ductal carcinoma of R breast, %, [place] 25% in 2014.  She had lumpectomy and IORT.  She saw Dr. Wilkes and was started on anastrazole, switched to letrozole, and a third treatment she cannot recall.  She was not able to tolerate any of the medications for longer than about one month at a time.  She experienced severe insomnia, anxiety, night sweats, and unintentional weight loss.  She then stopped endocrine therapy.  She followed regularly with breast surgery and was compliant with screening mammograms.\par \par In 10/2019, screening mammogram detected R breast abnormality.  Diagnostic mammogram revealed R breast mass 6 mm 9:00 N3 (S-shape).  Biopsy revealed intermediate grade DCIS, ER %, [place] %.  On 1/8/2020, patient underwent lumpectomy with Dr. 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  DCISion RT score was 3.7 (low risk).  Patient underwent re-excision on 1/27/2020 with negative margins.  She will forgo adjuvant breast radiotherapy.\par \par She now presents for discussion of adjuvant endocrine therapy. Patient indicates that secondary to severe side effects in the past, she is not interested in taking endocrine therapy.  She recovered well from surgery. [de-identified] : 5/26/21:\opal Guillen is here for followup visit. She was last time seen in 2/2020. She has h/o stage IA ( [id number]) HR -positive R-sided breast cancer s/p lumpectomy and IORT in 2014, unable to tolerate endocrine therapies. She had recurrent DCIS in the right and underwent lumpectomy on 1/8/2020, low to intermediate grade. DCISion RT score was 3.7 (low risk). She underwent re-excision on 1/27/2020 with negative margins. She did not have adjuvant breast radiotherapy. She also declined adjuvant endocrine therapy. On 1/22/21, she had b/l dx mammo and US which showed 2 lesions in the right breast. Biopsy revealed IDC, ER/MT positive and Her-2 positive (FISH 2.56). On 4/28/21, she had right breast simple mastectomy and SLNB. The pathology revealed 1.5 cm invasive moderately differentiated ductal carcinoma, ER pos 100%, MT pos 15%, Her-2 pos (FISH ratio 2.5), Ki 67 50%, ductal carcinoma in-situ (DCIS), micropapillary and papillary types with comedo necrosis, intermediate nuclear grade. No lymphovascular invasion is seen. One SLN is negative. AJCC 8th Edition Pathologic Stage: pT1c, p(sn)N0, pMx.\opal Guillen recovered well from surgery. She is here today to discuss adjuvant therapy. \par \par 7/29/21:\opal Guillen is here for followup visit and treatment. She has stage IA (zP5cH5F2) IDC of the right breast, G2, triple positive, s/p simple mastectomy and SLBN. She started on adjuvant endocrine therapy with Letrozole since 6/2021 and Herceptin in 7/2021. Due to her age, she opted not to have chemotherapy. She reports tolerating treatment well. \par 2D- ECHO from 6/11/21 showed  Normal global left ventricular systolic function. LV Ejection Fraction by Wells's Method with a biplane EF of 60 %.\par \par 9/8/21:\opal Guillen is here for followup visit and treatment. She has stage IA ([id number]) IDC of the right breast, G2, triple positive, s/p simple mastectomy and SLBN. She started on adjuvant endocrine therapy with Letrozole since 6/2021 and Herceptin in 7/2021. Due to her age, she opted not to have chemotherapy. She reports tolerating treatment well. \par 2D- ECHO from 6/11/21 showed  Normal global left ventricular systolic function. LV Ejection Fraction by Wells's Method with a biplane EF of 60 %.  ECHO should be repeated this month. She is status post 3 doses so far, tolerating well.  Scheduled for 4th dose tomorrow. She admits to some degree of fatigue and some level of anxiety.  She feels more nervous than usual.  Her appetite is good.\par \par 10/20/2021shanthi Guillen is here for followup visit and treatment. She has stage IA (kH1dY5U2) IDC of the right breast, G2, triple positive, s/p simple mastectomy and SLBN. She started on adjuvant endocrine therapy with Letrozole since 6/2021 and Herceptin in 7/2021. Due to her age, she opted not to have chemotherapy. She reports tolerating treatment well. \par 2D- ECHO from 9/21 showed EF of 65%. She is status post 3 doses so far, tolerating well.  Scheduled for 4th dose tomorrow. She admits to some degree of fatigue and some level of anxiety.  She feels more nervous than usual.  Her appetite is good. She fell down 2 weeks ago due to pulling from her dog. She had injury to her left knee, arm and face. \par \par 12/1/21shanthi Guillen is here for followup visit and treatment. She has stage IA ([id number]) IDC of the right breast, G2, triple positive, s/p simple mastectomy and SLBN. She started on adjuvant endocrine therapy with Letrozole since 6/2021 and Herceptin in 7/2021. Due to her age, she opted not to have chemotherapy. She reports feeling some arthralgia and stiffness. \par 2D- ECHO from 9/21 showed EF of 65%. She fell down a few weeks ago due to pulling from her dog. She had injury to her left knee which has recovered.\par \par 1/12/22shanthi Guillen is here for followup visit and treatment. She has stage IA ([id number]) IDC of the right breast, G2, triple positive, s/p simple mastectomy and SLBN. She started on adjuvant endocrine therapy with Letrozole since 6/2021 and Herceptin in 7/2021. Due to her age, she opted not to have chemotherapy. She reports feeling some arthralgia, difficult sleeping, anxiety, and mouth sores x 3 months. She was taking a medication for mouth sores, PMD rx and sores subsided. 2D- ECHO from 9/21 showed EF of 65%. She had left breast mammogram and US on 1/3/22 which showed stable nodule in left breast at 2:00 at 4 cm from nipple. \par \par 2/23//22:\par Mindy is here for followup visit and treatment. She has stage IA ([id number]) IDC of the right breast, G2, triple positive, s/p simple mastectomy and SLBN. She started on adjuvant endocrine therapy with Letrozole since 6/2021 and Herceptin in 7/2021. Due to her age, she opted not to have chemotherapy. She reports feeling with some arthralgia, difficult sleeping, anxiety, and skin itching from time to time. 2D- ECHO from 9/21 showed EF of 65%. She had left breast mammogram and US on 1/3/22 which showed stable nodule in left breast at 2:00 at 4 cm from nipple.

## 2022-02-28 NOTE — ASSESSMENT
[FreeTextEntry1] : 1. Newly diagnosed stage IA ([id number]) IDC of the right breast, G2, triple positive, s/p simple mastectomy and SLBN\par 2. H/O right breast HR positive DCIS, s/p lumpectomy in 1/2020.\par 3. H/O Stage IA (gJ6pE1T7) IDC of the right breast ER/DC positive s/p lumpectomy and IORT in 2014, unable to tolerate endocrine therapies.\par \par Recommendation:\par -- Continue Letrozole daily. \par -- Proceed with 11th dose of Herceptin. \par -- Bone density 5/28/21 showed osteopenia. Continue calcium and vitamin D supplement. \par -- Breast exam today did not reveal palpable abnormality. \par -- 2D Echo in 1/2022 showed normal LVEF. Will monitor every 3 months.\par -- RTO for followup in 6 weeks.\par \par

## 2022-03-09 NOTE — ASU PATIENT PROFILE, ADULT - TEACHING/LEARNING RELIGIOUS CONSIDERATIONS
Care Due:                  Date            Visit Type   Department     Provider  --------------------------------------------------------------------------------                                ESTABLISHED                              PATIENT -    [facility] INTERNAL  Last Visit: 03-      [facility]       Faiza Kuhn  Next Visit: None Scheduled  None         None Found                                                            Last  Test          Frequency    Reason                     Performed    Due Date  --------------------------------------------------------------------------------    Office Visit  12 months..  candesartan, traZODone...  03- 03-    Powered by [employer]. Reference number: [id number].   3/09/2022 10:04:20 AM CST   none

## 2022-03-16 ENCOUNTER — APPOINTMENT (OUTPATIENT)
Dept: INFUSION THERAPY | Facility: CLINIC | Age: 77
End: 2022-03-16

## 2022-03-16 ENCOUNTER — LABORATORY RESULT (OUTPATIENT)
Age: 77
End: 2022-03-16

## 2022-03-16 VITALS
SYSTOLIC BLOOD PRESSURE: 133 MMHG | RESPIRATION RATE: 16 BRPM | HEART RATE: 74 BPM | DIASTOLIC BLOOD PRESSURE: 75 MMHG | TEMPERATURE: 97.6 F

## 2022-03-16 RX ORDER — DIPHENHYDRAMINE HCL 50 MG
25 CAPSULE ORAL ONCE
Refills: 0 | Status: COMPLETED | OUTPATIENT
Start: 2022-03-16 | End: 2022-03-16

## 2022-03-16 RX ORDER — TRASTUZUMAB-DKST 420 MG/20ML
325 INJECTION, POWDER, LYOPHILIZED, FOR SOLUTION INTRAVENOUS ONCE
Refills: 0 | Status: COMPLETED | OUTPATIENT
Start: 2022-03-16 | End: 2022-03-16

## 2022-03-16 RX ORDER — ACETAMINOPHEN 500 MG
650 TABLET ORAL ONCE
Refills: 0 | Status: COMPLETED | OUTPATIENT
Start: 2022-03-16 | End: 2022-03-16

## 2022-03-16 RX ADMIN — Medication 101 MILLIGRAM(S): at 15:49

## 2022-03-16 RX ADMIN — Medication 650 MILLIGRAM(S): at 15:49

## 2022-03-16 RX ADMIN — TRASTUZUMAB-DKST 530.94 MILLIGRAM(S): 420 INJECTION, POWDER, LYOPHILIZED, FOR SOLUTION INTRAVENOUS at 16:11

## 2022-03-16 RX ADMIN — Medication 25 MILLIGRAM(S): at 16:10

## 2022-03-16 RX ADMIN — TRASTUZUMAB-DKST 325 MILLIGRAM(S): 420 INJECTION, POWDER, LYOPHILIZED, FOR SOLUTION INTRAVENOUS at 16:45

## 2022-03-16 RX ADMIN — Medication 650 MILLIGRAM(S): at 16:15

## 2022-03-17 ENCOUNTER — APPOINTMENT (OUTPATIENT)
Dept: BREAST CENTER | Facility: CLINIC | Age: 77
End: 2022-03-17
Payer: MEDICARE

## 2022-03-17 VITALS
TEMPERATURE: 97.6 F | BODY MASS INDEX: 22.66 KG/M2 | SYSTOLIC BLOOD PRESSURE: 153 MMHG | DIASTOLIC BLOOD PRESSURE: 84 MMHG | WEIGHT: 120 LBS | HEIGHT: 61 IN

## 2022-03-17 DIAGNOSIS — R92.8 OTHER ABNORMAL AND INCONCLUSIVE FINDINGS ON DIAGNOSTIC IMAGING OF BREAST: ICD-10-CM

## 2022-03-17 DIAGNOSIS — Z90.11 ACQUIRED ABSENCE OF RIGHT BREAST AND NIPPLE: ICD-10-CM

## 2022-03-17 PROCEDURE — 99213 OFFICE O/P EST LOW 20 MIN: CPT

## 2022-03-17 NOTE — DATA REVIEWED
[FreeTextEntry1] :  Jose Cruz 3, 2022\par \par MAMMO TOMOSYNTHESIS DIAGNOSTIC LEFT, US BREAST COMPLETE LEFT\par Clinical Breast Exam: Patient does report clinical breast exam in the last year.\par Clinical Indication: No family history of breast cancer. Patient has a personal history of right breast cancer, post right mastectomy.\par Compared to: 01/22/2021, 10/23/2019, and 10/09/2018\par \par  \par \par MAMMOGRAM: \par \par Tomosynthesis and 2D imaging of the left performed.  Current study was also evaluated with a computer aided detection (CAD) system.\par Breast Density: Heterogeneously dense, which may obscure small masses.\par \par  \par \par No significant masses, calcifications, or other findings are seen in the left breast.\par \par  \par \par US BREAST COMPLETE LEFT\par \par  \par \par Ultrasound evaluation was performed including examination of all four quadrants of the breast(s) and the retroareolar regions.\par \par  \par \par Color flow, Gray scale and real-time ultrasound of the left breast was performed. \par \par There is a well-circumscribed oval parallel stable nodule in the left breast at 2:00 axis at 4 cm from nipple measuring up to 6 mm. It can be reevaluated sonographically in 12 months. The previously visualized nodule in the left breast at 2:00 axis at 3 cm from nipple is not identified on the current study. No suspicious abnormalities were seen sonographically in the left breast.\par OVERALL IMPRESSION: \par Stable nodule in the left breast at 2:00 axis at 4 cm from the nipple. It can be reevaluated sonographically in 12 months at the time of next annual mammogram.\par \par As further detailed above, a 12 month follow-up DIAGNOSTIC imaging exam of the left breast(s) is recommended. A letter will be sent to the patient to return for follow up\par \par The findings and recommendations were communicated to the patient.\par BI-RADS 3: PROBABLY BENIGN \par

## 2022-03-17 NOTE — PHYSICAL EXAM
[Normocephalic] : normocephalic [Atraumatic] : atraumatic [No Supraclavicular Adenopathy] : no supraclavicular adenopathy [No Cervical Adenopathy] : no cervical adenopathy [Examined in the supine and seated position] : examined in the supine and seated position [No dominant masses] : no dominant masses in right breast  [No dominant masses] : no dominant masses left breast [No Nipple Discharge] : no left nipple discharge [No Axillary Lymphadenopathy] : no left axillary lymphadenopathy [No Edema] : no edema [No Rashes] : no rashes [No Ulceration] : no ulceration [Breast Nipple Inversion Left] : nipple not inverted [Breast Nipple Retraction Left] : nipple not retracted [de-identified] : well healed surgical scars.\par s/p right mastectomy.\par no palpable abnormalities in the left breast.

## 2022-03-17 NOTE — REVIEW OF SYSTEMS
[As Noted in HPI] : as noted in HPI [Itching] : itching [Negative] : Constitutional [Breast Pain] : no breast pain [Breast Lump] : no breast lump [Nipple Discharge] : no nipple discharge [Nipple Inverted] : no inversion of the nipple

## 2022-03-17 NOTE — HISTORY OF PRESENT ILLNESS
[FreeTextEntry1] : Patient with Right breast DCIS intermediate nuclear grade, papillary on ultrasound core biopsy 7/15/14; 10:00 N6-7, 18 mm.  \par Estrogen receptor positive\par Progesterone receptor positive\par \par Right breast DCIS intermediate nuclear grade, solid and papillary on ultrasound core biopsy 7/15/14; 11:00 N6-7, 3 mm.  \par \par History of Left breast biopsy for benign calcifications.\par Her family history is significant for her niece (sister's daughter) with breast cancer at 51.\par \par s/p Right NLOC of two areas and XOFT 9/15/14 demonstrating 4 mm mod diff IDC, with negative margins; extensive DCIS, micropapillary and solid types, intermediate nuclear grade, close superior, posterior and anterior margin.   No LVI or perineural invasion.\par \par Status post Right SNB 10/27/14 demonstrating 0/1 (-).  (pT1a pN0 pMx)\par \par Patient met with Dr. Wilkes and was on AI for three months, eventually discontinued  Arimidex and letrozole due to side effects.  She was not a candidate for chemotherapy.  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 AJCC 8th Edition Pathologic Stage: pTis (DCIS), pNx, pMx.\par \par Pt met w/ Dr. Garcia - (Feb 2020) she has declined adjuvant hormonal tx.\par \par s/p US Guided Core Bx - 02/16/2021:\par Right, 9:00 N3, 8mm: (hourglass)\par -Invasive ductal carcinoma with papillary features, moderately differentiated, associated with focal necrosis and microcalcifications.\par -Foreign body giant cell reaction.\par ER  (+)  %\par MS  (+)  41-50%\par HER2  (+)  by FISH.\par Ki-67   40%\par \par s/p Right Simple Mastectomy / SNB - 04/28/2021.\par \par Dr Garcia - Letrozole.  Pt also scheduled to begin Herceptin / Perjeta.\par \par INTERVAL HISTORY 10/04/21\par Mindy is 75 year old female here for her SIX MONTHS follow up \par She has stage IA ([id number]) IDC of the right breast, G2, triple positive, s/p simple mastectomy and SLBN. She started on adjuvant endocrine therapy with Letrozole since 6/2021 and Herceptin in 7/2021. Due to her age, she opted not to have chemotherapy. She reports tolerating treatment well. \par She has no breast related complaints at present time\par \par On her imaging:\par 09/2/21 U/S B/L\par LEFT US:\par -@2:00N 4 stable probably benign mass measuring 0.6 x 0.3 x 0.6 cm.\par -@2:00 N 3 stable probably benign mass measuring 0.4 x 0.2 x 0.4 cm--> ultrasound in 6 months is       recommended.\par RIGHT AXILLA:\par -right axillary tail corresponding to the area of pain, no suspicious abnormalities were identified.\par Deemed BI-RADS 3\par \par \par 03/17/2022 --\par Mindy is 76 year old female here for follow-up.\par She has stage IA ([id number]) IDC of the right breast, G2, triple positive, s/p simple mastectomy and SLBN. She started on adjuvant endocrine therapy with Letrozole since 6/2021 and Herceptin in 7/2021. Due to her age, she opted not to have chemotherapy. She reports tolerating treatment well. \par \par She has complaints of some itchiness of the right chest wall at this time.  She denies any breast pain, has not palpated any new palpable masses in either breast and denies any left nipple discharge or retraction.\par \par Her most recent imaging is as follows:\par 01/03/2011 - Left Uni Dx Mammo and US:\par -@2N4 Stable nodule --> Reevaluate sonographically in 12 months at the time of next annual mammogram.\par BIRADS3

## 2022-03-17 NOTE — ASSESSMENT
[FreeTextEntry1] : Mindy is 76 year old female here for follow-up.\par She has stage IA ([id number]) IDC of the right breast, G2, triple positive, s/p simple mastectomy and SLBN. She started on adjuvant endocrine therapy with Letrozole since 6/2021 and Herceptin in 7/2021. Due to her age, she opted not to have chemotherapy. She reports tolerating treatment well. \par \par She has complaints of some itchiness of the right chest wall at this time.  She denies any breast pain, has not palpated any new palpable masses in either breast and denies any left nipple discharge or retraction.\par \par Her most recent imaging is as follows:\par 01/03/2011 - Left Uni Dx Mammo and US:\par -@2N4 Stable nodule --> Reevaluate sonographically in 12 months at the time of next annual mammogram.\par BIRADS-3\par \par On physical exam, there are no discrete masses in LEFT breast or axilla. There is no nipple discharge or inversion noted. There are no skin changes bilaterally.\par \par We discussed BIRADS 3 lesions. These lesions have a 2% chance of harboring malignancy. There is always an option to obtain a tissue sample with a biopsy to confirm the diagnosis. The procedure was described in detail including the placement of a tissue marker clip. The risks of the procedure, including but not limited to bleeding and infection were also explained. She is not interested in biopsy at this time and agrees to continue with short-term interval imaging, which is traditionally performed at six-month intervals for approximately two years to establish stability.\par \par \par Plan:\par 1. Continue follow-up / treatment with Medical Oncology.\par 2. Return for follow-up and clinical breast exam in six months.\par 3. Otherwise, she will be due for Left Breast Uni Dx Mammo and Sono in January 2023.\par \par \par

## 2022-04-06 ENCOUNTER — APPOINTMENT (OUTPATIENT)
Dept: HEMATOLOGY ONCOLOGY | Facility: CLINIC | Age: 77
End: 2022-04-06
Payer: MEDICARE

## 2022-04-06 ENCOUNTER — APPOINTMENT (OUTPATIENT)
Dept: INFUSION THERAPY | Facility: CLINIC | Age: 77
End: 2022-04-06
Payer: MEDICARE

## 2022-04-06 ENCOUNTER — LABORATORY RESULT (OUTPATIENT)
Age: 77
End: 2022-04-06

## 2022-04-06 VITALS
RESPIRATION RATE: 16 BRPM | SYSTOLIC BLOOD PRESSURE: 143 MMHG | HEIGHT: 61 IN | HEART RATE: 81 BPM | TEMPERATURE: 96.4 F | BODY MASS INDEX: 22.28 KG/M2 | OXYGEN SATURATION: 98 % | DIASTOLIC BLOOD PRESSURE: 67 MMHG | WEIGHT: 118 LBS

## 2022-04-06 LAB
HCT VFR BLD CALC: 36 %
HCT VFR BLD CALC: 37 %
HGB BLD-MCNC: 12.3 G/DL
HGB BLD-MCNC: 12.5 G/DL
MCHC RBC-ENTMCNC: 31.9 PG
MCHC RBC-ENTMCNC: 31.9 PG
MCHC RBC-ENTMCNC: 33.8 G/DL
MCHC RBC-ENTMCNC: 34.2 G/DL
MCV RBC AUTO: 93.5 FL
MCV RBC AUTO: 94.4 FL
PLATELET # BLD AUTO: 251 K/UL
PLATELET # BLD AUTO: 287 K/UL
PMV BLD: 10.8 FL
PMV BLD: 10.9 FL
RBC # BLD: 3.85 M/UL
RBC # BLD: 3.92 M/UL
RBC # FLD: 12.7 %
RBC # FLD: 12.8 %
WBC # FLD AUTO: 8.15 K/UL
WBC # FLD AUTO: 9.22 K/UL

## 2022-04-06 PROCEDURE — 99214 OFFICE O/P EST MOD 30 MIN: CPT

## 2022-04-06 RX ORDER — DIPHENHYDRAMINE HCL 50 MG
25 CAPSULE ORAL ONCE
Refills: 0 | Status: COMPLETED | OUTPATIENT
Start: 2022-04-06 | End: 2022-04-06

## 2022-04-06 RX ORDER — ACETAMINOPHEN 500 MG
650 TABLET ORAL ONCE
Refills: 0 | Status: COMPLETED | OUTPATIENT
Start: 2022-04-06 | End: 2022-04-06

## 2022-04-06 RX ORDER — TRASTUZUMAB-DKST 420 MG/20ML
325 INJECTION, POWDER, LYOPHILIZED, FOR SOLUTION INTRAVENOUS ONCE
Refills: 0 | Status: COMPLETED | OUTPATIENT
Start: 2022-04-06 | End: 2022-04-06

## 2022-04-06 RX ADMIN — Medication 101 MILLIGRAM(S): at 14:58

## 2022-04-06 RX ADMIN — TRASTUZUMAB-DKST 530.94 MILLIGRAM(S): 420 INJECTION, POWDER, LYOPHILIZED, FOR SOLUTION INTRAVENOUS at 15:22

## 2022-04-06 RX ADMIN — Medication 650 MILLIGRAM(S): at 14:58

## 2022-04-06 NOTE — PHYSICAL EXAM
[Fully active, able to carry on all pre-disease performance without restriction] : Status 0 - Fully active, able to carry on all pre-disease performance without restriction [Normal] : affect appropriate [de-identified] : well-appearing [de-identified] : Status post right breast simple mastectomy and SLNB. Surgical incision is healed well. There is no palpable abnormality in the left breast.

## 2022-04-06 NOTE — REVIEW OF SYSTEMS
[Fatigue] : fatigue [Insomnia] : insomnia [Negative] : Heme/Lymph [Night Sweats] : night sweats [Hot Flashes] : hot flashes [FreeTextEntry2] : anxious, at times feels depressed [de-identified] : occasional insomnia is relieved with melatonin

## 2022-04-06 NOTE — ASSESSMENT
[FreeTextEntry1] : 1. Newly diagnosed stage IA ([id number]) IDC of the right breast, G2, triple positive, s/p simple mastectomy and SLBN\par 2. H/O right breast HR positive DCIS, s/p lumpectomy in 1/2020.\par 3. H/O Stage IA ([id number]) IDC of the right breast ER/PA positive s/p lumpectomy and IORT in 2014, unable to tolerate endocrine therapies.\par \par Recommendation:\par -- Continues on Letrozole; intolerable side effects including hot flashes, arthralgias, mood swings, and insomnia. We discussed switching to a different AI Exemestane. We will further discuss switching after finishing Herceptin. \par -- Proceed with 14th dose of Herceptin. \par -- Bone density 5/28/21 showed osteopenia. Continue calcium and vitamin D supplement. \par -- Breast exam today did not reveal palpable abnormality. Reviewed mammogram from 1/2022 which showed stable nodule in left breast at 2:00 at 4 cm from nipple. Will continue with annual mammogram and left breast sonogram\par -- 2D Echo in 1/2022 showed normal LVEF of 65% Will monitor every 3 months.\par -- RTO for followup in 6 weeks.\par \par Patient seen and examined with Dr. Garcia who agrees with plan of care.\par \par \par

## 2022-04-06 NOTE — HISTORY OF PRESENT ILLNESS
[de-identified] : 75 yo female is referred by Dr. 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 Patient was originally diagnosed with [id number] (4 mm, moderately differentiated) invasive ductal carcinoma of R breast, %, GA 25% in 2014.  She had lumpectomy and IORT.  She saw Dr. Wilkes and was started on anastrazole, switched to letrozole, and a third treatment she cannot recall.  She was not able to tolerate any of the medications for longer than about one month at a time.  She experienced severe insomnia, anxiety, night sweats, and unintentional weight loss.  She then stopped endocrine therapy.  She followed regularly with breast surgery and was compliant with screening mammograms.\par \par In 10/2019, screening mammogram detected R breast abnormality.  Diagnostic mammogram revealed R breast mass 6 mm 9:00 N3 (S-shape).  Biopsy revealed intermediate grade DCIS, ER %, GA %.  On 1/8/2020, patient underwent lumpectomy with Dr. 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  DCISion RT score was 3.7 (low risk).  Patient underwent re-excision on 1/27/2020 with negative margins.  She will forgo adjuvant breast radiotherapy.\par \par She now presents for discussion of adjuvant endocrine therapy. Patient indicates that secondary to severe side effects in the past, she is not interested in taking endocrine therapy.  She recovered well from surgery. [de-identified] : 5/26/21:\opal Guillen is here for followup visit. She was last time seen in 2/2020. She has h/o stage IA ( [id number]) HR -positive R-sided breast cancer s/p lumpectomy and IORT in 2014, unable to tolerate endocrine therapies. She had recurrent DCIS in the right and underwent lumpectomy on 1/8/2020, low to intermediate grade. DCISion RT score was 3.7 (low risk). She underwent re-excision on 1/27/2020 with negative margins. She did not have adjuvant breast radiotherapy. She also declined adjuvant endocrine therapy. On 1/22/21, she had b/l dx mammo and US which showed 2 lesions in the right breast. Biopsy revealed IDC, ER/MD positive and Her-2 positive (FISH 2.56). On 4/28/21, she had right breast simple mastectomy and SLNB. The pathology revealed 1.5 cm invasive moderately differentiated ductal carcinoma, ER pos 100%, MD pos 15%, Her-2 pos (FISH ratio 2.5), Ki 67 50%, ductal carcinoma in-situ (DCIS), micropapillary and papillary types with comedo necrosis, intermediate nuclear grade. No lymphovascular invasion is seen. One SLN is negative. AJCC 8th Edition Pathologic Stage: pT1c, p(sn)N0, pMx.\opal Guillen recovered well from surgery. She is here today to discuss adjuvant therapy. \par \par 7/29/21:\opal Guillen is here for followup visit and treatment. She has stage IA ([id number]) IDC of the right breast, G2, triple positive, s/p simple mastectomy and SLBN. She started on adjuvant endocrine therapy with Letrozole since 6/2021 and Herceptin in 7/2021. Due to her age, she opted not to have chemotherapy. She reports tolerating treatment well. \par 2D- ECHO from 6/11/21 showed  Normal global left ventricular systolic function. LV Ejection Fraction by Wells's Method with a biplane EF of 60 %.\par \par 9/8/21:\opal Guillen is here for followup visit and treatment. She has stage IA ([id number]) IDC of the right breast, G2, triple positive, s/p simple mastectomy and SLBN. She started on adjuvant endocrine therapy with Letrozole since 6/2021 and Herceptin in 7/2021. Due to her age, she opted not to have chemotherapy. She reports tolerating treatment well. \par 2D- ECHO from 6/11/21 showed  Normal global left ventricular systolic function. LV Ejection Fraction by Wells's Method with a biplane EF of 60 %.  ECHO should be repeated this month. She is status post 3 doses so far, tolerating well.  Scheduled for 4th dose tomorrow. She admits to some degree of fatigue and some level of anxiety.  She feels more nervous than usual.  Her appetite is good.\par \par 10/20/2021shanthi Guillen is here for followup visit and treatment. She has stage IA ([id number]) IDC of the right breast, G2, triple positive, s/p simple mastectomy and SLBN. She started on adjuvant endocrine therapy with Letrozole since 6/2021 and Herceptin in 7/2021. Due to her age, she opted not to have chemotherapy. She reports tolerating treatment well. \par 2D- ECHO from 9/21 showed EF of 65%. She is status post 3 doses so far, tolerating well.  Scheduled for 4th dose tomorrow. She admits to some degree of fatigue and some level of anxiety.  She feels more nervous than usual.  Her appetite is good. She fell down 2 weeks ago due to pulling from her dog. She had injury to her left knee, arm and face. \par \par 12/1/21shanthi Guillen is here for followup visit and treatment. She has stage IA ([id number]) IDC of the right breast, G2, triple positive, s/p simple mastectomy and SLBN. She started on adjuvant endocrine therapy with Letrozole since 6/2021 and Herceptin in 7/2021. Due to her age, she opted not to have chemotherapy. She reports feeling some arthralgia and stiffness. \par 2D- ECHO from 9/21 showed EF of 65%. She fell down a few weeks ago due to pulling from her dog. She had injury to her left knee which has recovered.\par \par 1/12/22shanthi Guillen is here for followup visit and treatment. She has stage IA ([id number]) IDC of the right breast, G2, triple positive, s/p simple mastectomy and SLBN. She started on adjuvant endocrine therapy with Letrozole since 6/2021 and Herceptin in 7/2021. Due to her age, she opted not to have chemotherapy. She reports feeling some arthralgia, difficult sleeping, anxiety, and mouth sores x 3 months. She was taking a medication for mouth sores, PMD rx and sores subsided. 2D- ECHO from 9/21 showed EF of 65%. She had left breast mammogram and US on 1/3/22 which showed stable nodule in left breast at 2:00 at 4 cm from nipple. \par \par 2/23//22:\par Mindy is here for followup visit and treatment. She has stage IA ([id number]) IDC of the right breast, G2, triple positive, s/p simple mastectomy and SLBN. She started on adjuvant endocrine therapy with Letrozole since 6/2021 and Herceptin in 7/2021. Due to her age, she opted not to have chemotherapy. She reports feeling with some arthralgia, difficult sleeping, anxiety, and skin itching from time to time. 2D- ECHO from 9/21 showed EF of 65%. She had left breast mammogram and US on 1/3/22 which showed stable nodule in left breast at 2:00 at 4 cm from nipple.\par \par 04/06/2022: MINDY is here for follow up visit for stage IA ([id number]) IDC of the right breast, G2, triple positive, s/p simple mastectomy and SLBN. She started on adjuvant endocrine therapy with Letrozole since 6/2021 and Herceptin in 7/2021. Due to her age, she opted not to have chemotherapy. She reports feeling with some arthralgia, difficult sleeping, anxiety, hot flashes and skin itching from time to time. 2D- ECHO from 1/2022 showed EF of 65%. She had left breast mammogram and US on 1/3/22 which showed stable nodule in left breast at 2:00 at 4 cm from nipple. She denies any new breast symptoms at this time. \par

## 2022-04-27 ENCOUNTER — APPOINTMENT (OUTPATIENT)
Dept: INFUSION THERAPY | Facility: CLINIC | Age: 77
End: 2022-04-27

## 2022-04-27 ENCOUNTER — LABORATORY RESULT (OUTPATIENT)
Age: 77
End: 2022-04-27

## 2022-04-27 RX ORDER — TRASTUZUMAB-DKST 420 MG/20ML
325 INJECTION, POWDER, LYOPHILIZED, FOR SOLUTION INTRAVENOUS ONCE
Refills: 0 | Status: COMPLETED | OUTPATIENT
Start: 2022-04-27 | End: 2022-04-27

## 2022-04-27 RX ORDER — ACETAMINOPHEN 500 MG
650 TABLET ORAL ONCE
Refills: 0 | Status: COMPLETED | OUTPATIENT
Start: 2022-04-27 | End: 2022-04-27

## 2022-04-27 RX ORDER — DIPHENHYDRAMINE HCL 50 MG
25 CAPSULE ORAL ONCE
Refills: 0 | Status: COMPLETED | OUTPATIENT
Start: 2022-04-27 | End: 2022-04-27

## 2022-04-27 RX ADMIN — Medication 650 MILLIGRAM(S): at 15:31

## 2022-04-27 RX ADMIN — TRASTUZUMAB-DKST 530.94 MILLIGRAM(S): 420 INJECTION, POWDER, LYOPHILIZED, FOR SOLUTION INTRAVENOUS at 15:45

## 2022-04-27 RX ADMIN — Medication 101 MILLIGRAM(S): at 15:33

## 2022-05-18 ENCOUNTER — LABORATORY RESULT (OUTPATIENT)
Age: 77
End: 2022-05-18

## 2022-05-18 ENCOUNTER — APPOINTMENT (OUTPATIENT)
Dept: INFUSION THERAPY | Facility: CLINIC | Age: 77
End: 2022-05-18
Payer: MEDICARE

## 2022-05-18 ENCOUNTER — APPOINTMENT (OUTPATIENT)
Dept: HEMATOLOGY ONCOLOGY | Facility: CLINIC | Age: 77
End: 2022-05-18
Payer: MEDICARE

## 2022-05-18 ENCOUNTER — OUTPATIENT (OUTPATIENT)
Dept: OUTPATIENT SERVICES | Facility: HOSPITAL | Age: 77
LOS: 1 days | Discharge: HOME | End: 2022-05-18

## 2022-05-18 VITALS
TEMPERATURE: 97.6 F | BODY MASS INDEX: 21.71 KG/M2 | WEIGHT: 115 LBS | HEIGHT: 61 IN | HEART RATE: 79 BPM | DIASTOLIC BLOOD PRESSURE: 88 MMHG | SYSTOLIC BLOOD PRESSURE: 138 MMHG

## 2022-05-18 DIAGNOSIS — Z98.890 OTHER SPECIFIED POSTPROCEDURAL STATES: Chronic | ICD-10-CM

## 2022-05-18 DIAGNOSIS — D05.11 INTRADUCTAL CARCINOMA IN SITU OF RIGHT BREAST: ICD-10-CM

## 2022-05-18 DIAGNOSIS — Z51.81 ENCOUNTER FOR THERAPEUTIC DRUG LEVEL MONITORING: ICD-10-CM

## 2022-05-18 DIAGNOSIS — C50.411 MALIGNANT NEOPLASM OF UPPER-OUTER QUADRANT OF RIGHT FEMALE BREAST: ICD-10-CM

## 2022-05-18 DIAGNOSIS — Z79.811 ENCOUNTER FOR THERAPEUTIC DRUG LVL MONITORING: ICD-10-CM

## 2022-05-18 DIAGNOSIS — Z51.81 ENCOUNTER FOR THERAPEUTIC DRUG LVL MONITORING: ICD-10-CM

## 2022-05-18 DIAGNOSIS — Z51.11 ENCOUNTER FOR ANTINEOPLASTIC CHEMOTHERAPY: ICD-10-CM

## 2022-05-18 PROCEDURE — 99214 OFFICE O/P EST MOD 30 MIN: CPT

## 2022-05-18 RX ORDER — TRASTUZUMAB-DKST 420 MG/20ML
313 INJECTION, POWDER, LYOPHILIZED, FOR SOLUTION INTRAVENOUS ONCE
Refills: 0 | Status: COMPLETED | OUTPATIENT
Start: 2022-05-18 | End: 2022-05-18

## 2022-05-18 RX ORDER — ACETAMINOPHEN 500 MG
650 TABLET ORAL ONCE
Refills: 0 | Status: COMPLETED | OUTPATIENT
Start: 2022-05-18 | End: 2022-05-18

## 2022-05-18 RX ORDER — DIPHENHYDRAMINE HCL 50 MG
25 CAPSULE ORAL ONCE
Refills: 0 | Status: COMPLETED | OUTPATIENT
Start: 2022-05-18 | End: 2022-05-18

## 2022-05-18 RX ADMIN — Medication 25 MILLIGRAM(S): at 11:46

## 2022-05-18 RX ADMIN — Medication 650 MILLIGRAM(S): at 11:30

## 2022-05-18 RX ADMIN — Medication 101 MILLIGRAM(S): at 11:31

## 2022-05-18 RX ADMIN — TRASTUZUMAB-DKST 529.8 MILLIGRAM(S): 420 INJECTION, POWDER, LYOPHILIZED, FOR SOLUTION INTRAVENOUS at 11:55

## 2022-05-18 NOTE — ASSESSMENT
[FreeTextEntry1] : 1. Newly diagnosed stage IA ([id number]) IDC of the right breast, G2, triple positive, s/p simple mastectomy and SLBN\par 2. H/O right breast HR positive DCIS, s/p lumpectomy in 1/2020.\par 3. H/O Stage IA ([id number]) IDC of the right breast ER/AL positive s/p lumpectomy and IORT in 2014, unable to tolerate endocrine therapies.\par \par Assessment and Plan:\par -- Reviewed AI related side effects. Advised her to take from Letrozole for 2 weeks off and resume it afterward. She has tried Anastrozole and exemestane previously and could not tolerated. \par -- Proceed Herceptin treatment today.\par -- 2D Echo in 1/2022 showed normal LVEF of 65%. A referral was given for repeat 2D Echo.  \par --Osteopenia. Continue calcium and vitamin D supplement and regular exercise. \par -- Breast exam today did not reveal palpable abnormality. Reviewed mammogram from 1/2022 which showed stable nodule in left breast at 2:00 at 4 cm from nipple. Will continue with annual mammogram and left breast sonogram\par -- RTO for followup in 6 weeks.\par \par \par \par

## 2022-05-18 NOTE — REVIEW OF SYSTEMS
[Night Sweats] : night sweats [Fatigue] : fatigue [Insomnia] : insomnia [Hot Flashes] : hot flashes [Negative] : Heme/Lymph [FreeTextEntry2] : anxious, at times feels depressed [de-identified] : occasional insomnia is relieved with melatonin

## 2022-05-18 NOTE — PHYSICAL EXAM
[Fully active, able to carry on all pre-disease performance without restriction] : Status 0 - Fully active, able to carry on all pre-disease performance without restriction [Normal] : affect appropriate [de-identified] : well-appearing [de-identified] : Status post right breast simple mastectomy and SLNB. Surgical incision is healed well. There is no palpable abnormality in the left breast.

## 2022-05-18 NOTE — HISTORY OF PRESENT ILLNESS
[de-identified] : 73 yo female is referred by Dr. 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 Patient was originally diagnosed with [id number] (4 mm, moderately differentiated) invasive ductal carcinoma of R breast, %, ME 25% in 2014.  She had lumpectomy and IORT.  She saw Dr. Wilkes and was started on anastrazole, switched to letrozole, and a third treatment she cannot recall.  She was not able to tolerate any of the medications for longer than about one month at a time.  She experienced severe insomnia, anxiety, night sweats, and unintentional weight loss.  She then stopped endocrine therapy.  She followed regularly with breast surgery and was compliant with screening mammograms.\par \par In 10/2019, screening mammogram detected R breast abnormality.  Diagnostic mammogram revealed R breast mass 6 mm 9:00 N3 (S-shape).  Biopsy revealed intermediate grade DCIS, ER %, ME %.  On 1/8/2020, patient underwent lumpectomy with Dr. 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  DCISion RT score was 3.7 (low risk).  Patient underwent re-excision on 1/27/2020 with negative margins.  She will forgo adjuvant breast radiotherapy.\par \par She now presents for discussion of adjuvant endocrine therapy. Patient indicates that secondary to severe side effects in the past, she is not interested in taking endocrine therapy.  She recovered well from surgery. [de-identified] : 5/26/21:\opal Guillen is here for followup visit. She was last time seen in 2/2020. She has h/o stage IA ( [id number]) HR -positive R-sided breast cancer s/p lumpectomy and IORT in 2014, unable to tolerate endocrine therapies. She had recurrent DCIS in the right and underwent lumpectomy on 1/8/2020, low to intermediate grade. DCISion RT score was 3.7 (low risk). She underwent re-excision on 1/27/2020 with negative margins. She did not have adjuvant breast radiotherapy. She also declined adjuvant endocrine therapy. On 1/22/21, she had b/l dx mammo and US which showed 2 lesions in the right breast. Biopsy revealed IDC, ER/KS positive and Her-2 positive (FISH 2.56). On 4/28/21, she had right breast simple mastectomy and SLNB. The pathology revealed 1.5 cm invasive moderately differentiated ductal carcinoma, ER pos 100%, KS pos 15%, Her-2 pos (FISH ratio 2.5), Ki 67 50%, ductal carcinoma in-situ (DCIS), micropapillary and papillary types with comedo necrosis, intermediate nuclear grade. No lymphovascular invasion is seen. One SLN is negative. AJCC 8th Edition Pathologic Stage: pT1c, p(sn)N0, pMx.\opal Guillen recovered well from surgery. She is here today to discuss adjuvant therapy. \par \par 7/29/21:\opal Guillen is here for followup visit and treatment. She has stage IA ([id number]) IDC of the right breast, G2, triple positive, s/p simple mastectomy and SLBN. She started on adjuvant endocrine therapy with Letrozole since 6/2021 and Herceptin in 7/2021. Due to her age, she opted not to have chemotherapy. She reports tolerating treatment well. \par 2D- ECHO from 6/11/21 showed  Normal global left ventricular systolic function. LV Ejection Fraction by Wells's Method with a biplane EF of 60 %.\par \par 9/8/21:\opal Guillen is here for followup visit and treatment. She has stage IA ([id number]) IDC of the right breast, G2, triple positive, s/p simple mastectomy and SLBN. She started on adjuvant endocrine therapy with Letrozole since 6/2021 and Herceptin in 7/2021. Due to her age, she opted not to have chemotherapy. She reports tolerating treatment well. \par 2D- ECHO from 6/11/21 showed  Normal global left ventricular systolic function. LV Ejection Fraction by Wells's Method with a biplane EF of 60 %.  ECHO should be repeated this month. She is status post 3 doses so far, tolerating well.  Scheduled for 4th dose tomorrow. She admits to some degree of fatigue and some level of anxiety.  She feels more nervous than usual.  Her appetite is good.\par \par 10/20/2021shanthi Guillen is here for followup visit and treatment. She has stage IA ([id number]) IDC of the right breast, G2, triple positive, s/p simple mastectomy and SLBN. She started on adjuvant endocrine therapy with Letrozole since 6/2021 and Herceptin in 7/2021. Due to her age, she opted not to have chemotherapy. She reports tolerating treatment well. \par 2D- ECHO from 9/21 showed EF of 65%. She is status post 3 doses so far, tolerating well.  Scheduled for 4th dose tomorrow. She admits to some degree of fatigue and some level of anxiety.  She feels more nervous than usual.  Her appetite is good. She fell down 2 weeks ago due to pulling from her dog. She had injury to her left knee, arm and face. \par \par 12/1/21shanthi Guillen is here for followup visit and treatment. She has stage IA ([id number]) IDC of the right breast, G2, triple positive, s/p simple mastectomy and SLBN. She started on adjuvant endocrine therapy with Letrozole since 6/2021 and Herceptin in 7/2021. Due to her age, she opted not to have chemotherapy. She reports feeling some arthralgia and stiffness. \par 2D- ECHO from 9/21 showed EF of 65%. She fell down a few weeks ago due to pulling from her dog. She had injury to her left knee which has recovered.\par \par 1/12/22shanthi Guillen is here for followup visit and treatment. She has stage IA ([id number]) IDC of the right breast, G2, triple positive, s/p simple mastectomy and SLBN. She started on adjuvant endocrine therapy with Letrozole since 6/2021 and Herceptin in 7/2021. Due to her age, she opted not to have chemotherapy. She reports feeling some arthralgia, difficult sleeping, anxiety, and mouth sores x 3 months. She was taking a medication for mouth sores, PMD rx and sores subsided. 2D- ECHO from 9/21 showed EF of 65%. She had left breast mammogram and US on 1/3/22 which showed stable nodule in left breast at 2:00 at 4 cm from nipple. \par \par 2/23//22:\par Mindy is here for followup visit and treatment. She has stage IA ([id number]) IDC of the right breast, G2, triple positive, s/p simple mastectomy and SLBN. She started on adjuvant endocrine therapy with Letrozole since 6/2021 and Herceptin in 7/2021. Due to her age, she opted not to have chemotherapy. She reports feeling with some arthralgia, difficult sleeping, anxiety, and skin itching from time to time. 2D- ECHO from 9/21 showed EF of 65%. She had left breast mammogram and US on 1/3/22 which showed stable nodule in left breast at 2:00 at 4 cm from nipple.\par \par 04/06/2022: MINDY is here for follow up visit for stage IA ([id number]) IDC of the right breast, G2, triple positive, s/p simple mastectomy and SLBN. She started on adjuvant endocrine therapy with Letrozole since 6/2021 and Herceptin in 7/2021. Due to her age, she opted not to have chemotherapy. She reports feeling with some arthralgia, difficult sleeping, anxiety, hot flashes and skin itching from time to time. 2D- ECHO from 1/2022 showed EF of 65%. She had left breast mammogram and US on 1/3/22 which showed stable nodule in left breast at 2:00 at 4 cm from nipple. She denies any new breast symptoms at this time. \par \par 5/18/22\par  MINDY is here for follow up visit and treatment. She has stage IA ([id number]) IDC of the right breast, G2, triple positive, s/p simple mastectomy and SLBN. She has been on  adjuvant endocrine therapy with Letrozole since 6/2021 and Herceptin in 7/2021. Due to her age, she opted not to have chemotherapy. She complains weight loss, hot flashes, insomnia and anxiety. 2D- ECHO from 1/2022 showed EF of 65%. She had left breast mammogram and US on 1/3/22 which showed stable nodule in left breast at 2:00 at 4 cm from nipple.

## 2022-05-23 ENCOUNTER — LABORATORY RESULT (OUTPATIENT)
Age: 77
End: 2022-05-23

## 2022-05-25 ENCOUNTER — FORM ENCOUNTER (OUTPATIENT)
Age: 77
End: 2022-05-25

## 2022-05-26 ENCOUNTER — OUTPATIENT (OUTPATIENT)
Dept: OUTPATIENT SERVICES | Facility: HOSPITAL | Age: 77
LOS: 1 days | Discharge: HOME | End: 2022-05-26
Payer: MEDICARE

## 2022-05-26 DIAGNOSIS — Z01.818 ENCOUNTER FOR OTHER PREPROCEDURAL EXAMINATION: ICD-10-CM

## 2022-05-26 DIAGNOSIS — Z98.890 OTHER SPECIFIED POSTPROCEDURAL STATES: Chronic | ICD-10-CM

## 2022-05-26 PROCEDURE — 93306 TTE W/DOPPLER COMPLETE: CPT | Mod: 26

## 2022-06-15 ENCOUNTER — APPOINTMENT (OUTPATIENT)
Dept: INFUSION THERAPY | Facility: CLINIC | Age: 77
End: 2022-06-15

## 2022-08-03 ENCOUNTER — APPOINTMENT (OUTPATIENT)
Dept: HEMATOLOGY ONCOLOGY | Facility: CLINIC | Age: 77
End: 2022-08-03

## 2022-09-20 ENCOUNTER — APPOINTMENT (OUTPATIENT)
Dept: BREAST CENTER | Facility: CLINIC | Age: 77
End: 2022-09-20

## 2022-09-22 ENCOUNTER — APPOINTMENT (OUTPATIENT)
Dept: BREAST CENTER | Facility: CLINIC | Age: 77
End: 2022-09-22

## 2022-11-27 LAB
ALBUMIN SERPL ELPH-MCNC: 4.4 G/DL
ALBUMIN SERPL ELPH-MCNC: 4.6 G/DL
ALBUMIN SERPL ELPH-MCNC: 4.6 G/DL
ALP BLD-CCNC: 67 U/L
ALP BLD-CCNC: 80 U/L
ALP BLD-CCNC: 86 U/L
ALT SERPL-CCNC: 23 U/L
ALT SERPL-CCNC: 24 U/L
ALT SERPL-CCNC: 5 U/L
ANION GAP SERPL CALC-SCNC: 13 MMOL/L
ANION GAP SERPL CALC-SCNC: 13 MMOL/L
ANION GAP SERPL CALC-SCNC: 4 MMOL/L
AST SERPL-CCNC: 24 U/L
AST SERPL-CCNC: 32 U/L
AST SERPL-CCNC: 43 U/L
BILIRUB DIRECT SERPL-MCNC: <0.2 MG/DL
BILIRUB DIRECT SERPL-MCNC: <0.2 MG/DL
BILIRUB DIRECT SERPL-MCNC: NORMAL MG/DL
BILIRUB INDIRECT SERPL-MCNC: >0.1 MG/DL
BILIRUB INDIRECT SERPL-MCNC: >0.2 MG/DL
BILIRUB INDIRECT SERPL-MCNC: NORMAL MG/DL
BILIRUB SERPL-MCNC: 0.3 MG/DL
BILIRUB SERPL-MCNC: 0.4 MG/DL
BILIRUB SERPL-MCNC: 0.4 MG/DL
BUN SERPL-MCNC: 15 MG/DL
BUN SERPL-MCNC: 15 MG/DL
BUN SERPL-MCNC: 18 MG/DL
CALCIUM SERPL-MCNC: 10.1 MG/DL
CALCIUM SERPL-MCNC: 10.3 MG/DL
CALCIUM SERPL-MCNC: 9.4 MG/DL
CHLORIDE SERPL-SCNC: 100 MMOL/L
CHLORIDE SERPL-SCNC: 102 MMOL/L
CHLORIDE SERPL-SCNC: 98 MMOL/L
CO2 SERPL-SCNC: 21 MMOL/L
CO2 SERPL-SCNC: 23 MMOL/L
CO2 SERPL-SCNC: 24 MMOL/L
CREAT SERPL-MCNC: 0.7 MG/DL
CREAT SERPL-MCNC: 0.9 MG/DL
CREAT SERPL-MCNC: 1 MG/DL
EGFR: 58 ML/MIN/1.73M2
EGFR: 65 ML/MIN/1.73M2
EGFR: 88 ML/MIN/1.73M2
GLUCOSE SERPL-MCNC: 122 MG/DL
GLUCOSE SERPL-MCNC: 84 MG/DL
GLUCOSE SERPL-MCNC: 91 MG/DL
HCT VFR BLD CALC: 37.3 %
HCT VFR BLD CALC: 37.6 %
HGB BLD-MCNC: 12.9 G/DL
HGB BLD-MCNC: 12.9 G/DL
MCHC RBC-ENTMCNC: 32.4 PG
MCHC RBC-ENTMCNC: 32.4 PG
MCHC RBC-ENTMCNC: 34.3 G/DL
MCHC RBC-ENTMCNC: 34.6 G/DL
MCV RBC AUTO: 93.7 FL
MCV RBC AUTO: 94.5 FL
PLATELET # BLD AUTO: 292 K/UL
PLATELET # BLD AUTO: 305 K/UL
PMV BLD: 10.5 FL
PMV BLD: 10.5 FL
POTASSIUM SERPL-SCNC: 4.4 MMOL/L
POTASSIUM SERPL-SCNC: 5.3 MMOL/L
POTASSIUM SERPL-SCNC: NORMAL MMOL/L
PROT SERPL-MCNC: 7 G/DL
PROT SERPL-MCNC: 7 G/DL
PROT SERPL-MCNC: 8.4 G/DL
RBC # BLD: 3.98 M/UL
RBC # BLD: 3.98 M/UL
RBC # FLD: 12.5 %
RBC # FLD: 12.6 %
SODIUM SERPL-SCNC: 126 MMOL/L
SODIUM SERPL-SCNC: 136 MMOL/L
SODIUM SERPL-SCNC: 136 MMOL/L
WBC # FLD AUTO: 7.54 K/UL
WBC # FLD AUTO: 8.9 K/UL

## 2024-02-12 ENCOUNTER — APPOINTMENT (OUTPATIENT)
Dept: CARDIOLOGY | Facility: CLINIC | Age: 79
End: 2024-02-12
Payer: MEDICARE

## 2024-02-12 VITALS
SYSTOLIC BLOOD PRESSURE: 134 MMHG | DIASTOLIC BLOOD PRESSURE: 82 MMHG | HEIGHT: 61 IN | RESPIRATION RATE: 16 BRPM | HEART RATE: 67 BPM | OXYGEN SATURATION: 97 % | BODY MASS INDEX: 19.45 KG/M2 | WEIGHT: 103 LBS

## 2024-02-12 DIAGNOSIS — R42 DIZZINESS AND GIDDINESS: ICD-10-CM

## 2024-02-12 DIAGNOSIS — R09.89 OTHER SPECIFIED SYMPTOMS AND SIGNS INVOLVING THE CIRCULATORY AND RESPIRATORY SYSTEMS: ICD-10-CM

## 2024-02-12 DIAGNOSIS — R00.2 PALPITATIONS: ICD-10-CM

## 2024-02-12 DIAGNOSIS — I10 ESSENTIAL (PRIMARY) HYPERTENSION: ICD-10-CM

## 2024-02-12 DIAGNOSIS — R01.1 CARDIAC MURMUR, UNSPECIFIED: ICD-10-CM

## 2024-02-12 PROCEDURE — 93000 ELECTROCARDIOGRAM COMPLETE: CPT

## 2024-02-12 PROCEDURE — 93306 TTE W/DOPPLER COMPLETE: CPT

## 2024-02-12 PROCEDURE — 99204 OFFICE O/P NEW MOD 45 MIN: CPT

## 2024-02-12 PROCEDURE — 93880 EXTRACRANIAL BILAT STUDY: CPT

## 2024-02-12 RX ORDER — PROCHLORPERAZINE MALEATE 10 MG/1
10 TABLET ORAL EVERY 6 HOURS
Qty: 30 | Refills: 3 | Status: DISCONTINUED | COMMUNITY
Start: 2021-06-29 | End: 2024-02-12

## 2024-02-12 RX ORDER — DOXYCYCLINE HYCLATE 100 MG/1
100 CAPSULE ORAL
Qty: 20 | Refills: 0 | Status: DISCONTINUED | COMMUNITY
Start: 2021-04-28 | End: 2024-02-12

## 2024-02-12 RX ORDER — OXYCODONE AND ACETAMINOPHEN 5; 325 MG/1; MG/1
5-325 TABLET ORAL
Qty: 15 | Refills: 0 | Status: DISCONTINUED | COMMUNITY
Start: 2021-04-28 | End: 2024-02-12

## 2024-02-12 RX ORDER — AMLODIPINE BESYLATE 2.5 MG/1
2.5 TABLET ORAL DAILY
Refills: 0 | Status: ACTIVE | COMMUNITY
Start: 2024-02-12

## 2024-02-14 NOTE — DISCUSSION/SUMMARY
[EKG obtained to assist in diagnosis and management of assessed problem(s)] : EKG obtained to assist in diagnosis and management of assessed problem(s) [FreeTextEntry1] : EKG performed today was unremarkable.  Murmur: Recommend an echocardiogram to evaluate for LV function. Echocardiogram performed today revealed normal LV function.   Bruit: Recommend a carotid duplex to evaluate bruit. Carotid duplex performed today revealed no evidence of hemodynamically significant stenosis. Incidentally found to have bilateral vascularized thyroid nodules. Recommend dedicated thyroid duplex.   Lightheadedness/Dizziness: Recommend an MCOT monitor to further evaluate.   HTN: The impression is hypertension. Currently, the condition is controlled. There are no changes in medication management. Continue current medical therapy. Other planned treatments include an exercise regimen, weight loss, low sodium diet, and alcohol moderation.  Instructed to follow up after testing is complete.  Plan was discussed with the patient.

## 2024-02-14 NOTE — PHYSICAL EXAM
[Well Developed] : well developed [Well Nourished] : well nourished [No Acute Distress] : no acute distress [Normal Conjunctiva] : normal conjunctiva [Normal Venous Pressure] : normal venous pressure [Normal S1, S2] : normal S1, S2 [No Rub] : no rub [No Gallop] : no gallop [II] : a grade 2 [Clear Lung Fields] : clear lung fields [Good Air Entry] : good air entry [No Respiratory Distress] : no respiratory distress  [Soft] : abdomen soft [Non Tender] : non-tender [No Masses/organomegaly] : no masses/organomegaly [Normal Bowel Sounds] : normal bowel sounds [Normal Gait] : normal gait [No Edema] : no edema [No Cyanosis] : no cyanosis [No Clubbing] : no clubbing [No Varicosities] : no varicosities [No Rash] : no rash [No Skin Lesions] : no skin lesions [Moves all extremities] : moves all extremities [No Focal Deficits] : no focal deficits [Normal Speech] : normal speech [Alert and Oriented] : alert and oriented [Normal memory] : normal memory [de-identified] : (+) Carotid Bruit

## 2024-02-14 NOTE — HISTORY OF PRESENT ILLNESS
[FreeTextEntry1] : YADIRA MUNIZ is an 78-year-old female, with a PMHx significant for HTN, who presents today reporting that she feels lightheaded and dizzy sporadically, 2-3 times a week, and that her heart rate drops. Otherwise:(-) trauma, (-) tinnitus, (-) hearing loss, (-) chest pain, (-) dyspnea, (-) fever, (-) vomiting, (-) diarrhea, (-) GI bleeding.

## 2024-02-24 DIAGNOSIS — F41.9 ANXIETY DISORDER, UNSPECIFIED: ICD-10-CM

## 2024-02-24 RX ORDER — ALPRAZOLAM 0.25 MG/1
0.25 TABLET ORAL
Refills: 0 | Status: ACTIVE | COMMUNITY
Start: 2024-02-24

## 2024-05-10 ENCOUNTER — EMERGENCY (EMERGENCY)
Facility: HOSPITAL | Age: 79
LOS: 0 days | Discharge: HOME | End: 2024-05-10
Attending: EMERGENCY MEDICINE
Payer: COMMERCIAL

## 2024-05-10 VITALS
DIASTOLIC BLOOD PRESSURE: 78 MMHG | HEART RATE: 98 BPM | HEIGHT: 62 IN | TEMPERATURE: 98 F | RESPIRATION RATE: 18 BRPM | SYSTOLIC BLOOD PRESSURE: 149 MMHG | OXYGEN SATURATION: 100 % | WEIGHT: 102.96 LBS

## 2024-05-10 DIAGNOSIS — Z23 ENCOUNTER FOR IMMUNIZATION: ICD-10-CM
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
## 2024-05-10 DIAGNOSIS — M79.605 PAIN IN LEFT LEG: ICD-10-CM

## 2024-05-10 DIAGNOSIS — R07.81 PLEURODYNIA: ICD-10-CM

## 2024-05-10 DIAGNOSIS — R07.89 OTHER CHEST PAIN: ICD-10-CM
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
## 2024-05-10 DIAGNOSIS — Z98.890 OTHER SPECIFIED POSTPROCEDURAL STATES: Chronic | ICD-10-CM

## 2024-05-10 DIAGNOSIS — I10 ESSENTIAL (PRIMARY) HYPERTENSION: ICD-10-CM

## 2024-05-10 LAB
ALBUMIN SERPL ELPH-MCNC: 5 G/DL — SIGNIFICANT CHANGE UP (ref 3.5–5.2)
ALP SERPL-CCNC: 100 U/L — SIGNIFICANT CHANGE UP (ref 30–115)
ALT FLD-CCNC: 30 U/L — SIGNIFICANT CHANGE UP (ref 0–41)
ANION GAP SERPL CALC-SCNC: 11 MMOL/L — SIGNIFICANT CHANGE UP (ref 7–14)
AST SERPL-CCNC: 41 U/L — SIGNIFICANT CHANGE UP (ref 0–41)
BASOPHILS # BLD AUTO: 0.17 K/UL — SIGNIFICANT CHANGE UP (ref 0–0.2)
BASOPHILS NFR BLD AUTO: 2.2 % — HIGH (ref 0–1)
BILIRUB SERPL-MCNC: 0.5 MG/DL — SIGNIFICANT CHANGE UP (ref 0.2–1.2)
BUN SERPL-MCNC: 14 MG/DL — SIGNIFICANT CHANGE UP (ref 10–20)
CALCIUM SERPL-MCNC: 10.4 MG/DL — SIGNIFICANT CHANGE UP (ref 8.4–10.5)
CHLORIDE SERPL-SCNC: 105 MMOL/L — SIGNIFICANT CHANGE UP (ref 98–110)
CO2 SERPL-SCNC: 27 MMOL/L — SIGNIFICANT CHANGE UP (ref 17–32)
CREAT SERPL-MCNC: 0.7 MG/DL — SIGNIFICANT CHANGE UP (ref 0.7–1.5)
EGFR: 87 ML/MIN/1.73M2 — SIGNIFICANT CHANGE UP
EOSINOPHIL # BLD AUTO: 0.05 K/UL — SIGNIFICANT CHANGE UP (ref 0–0.7)
EOSINOPHIL NFR BLD AUTO: 0.7 % — SIGNIFICANT CHANGE UP (ref 0–8)
GLUCOSE SERPL-MCNC: 94 MG/DL — SIGNIFICANT CHANGE UP (ref 70–99)
HCT VFR BLD CALC: 43.1 % — SIGNIFICANT CHANGE UP (ref 37–47)
HGB BLD-MCNC: 14.3 G/DL — SIGNIFICANT CHANGE UP (ref 12–16)
IMM GRANULOCYTES NFR BLD AUTO: 0.5 % — HIGH (ref 0.1–0.3)
LIDOCAIN IGE QN: 40 U/L — SIGNIFICANT CHANGE UP (ref 7–60)
LYMPHOCYTES # BLD AUTO: 1.43 K/UL — SIGNIFICANT CHANGE UP (ref 1.2–3.4)
LYMPHOCYTES # BLD AUTO: 18.8 % — LOW (ref 20.5–51.1)
MCHC RBC-ENTMCNC: 32.5 PG — HIGH (ref 27–31)
MCHC RBC-ENTMCNC: 33.2 G/DL — SIGNIFICANT CHANGE UP (ref 32–37)
MCV RBC AUTO: 98 FL — SIGNIFICANT CHANGE UP (ref 81–99)
MONOCYTES # BLD AUTO: 0.54 K/UL — SIGNIFICANT CHANGE UP (ref 0.1–0.6)
MONOCYTES NFR BLD AUTO: 7.1 % — SIGNIFICANT CHANGE UP (ref 1.7–9.3)
NEUTROPHILS # BLD AUTO: 5.36 K/UL — SIGNIFICANT CHANGE UP (ref 1.4–6.5)
NEUTROPHILS NFR BLD AUTO: 70.7 % — SIGNIFICANT CHANGE UP (ref 42.2–75.2)
NRBC # BLD: 0 /100 WBCS — SIGNIFICANT CHANGE UP (ref 0–0)
PLATELET # BLD AUTO: 415 K/UL — HIGH (ref 130–400)
PMV BLD: 10.5 FL — HIGH (ref 7.4–10.4)
POTASSIUM SERPL-MCNC: 5.3 MMOL/L — HIGH (ref 3.5–5)
POTASSIUM SERPL-SCNC: 5.3 MMOL/L — HIGH (ref 3.5–5)
PROT SERPL-MCNC: 7.9 G/DL — SIGNIFICANT CHANGE UP (ref 6–8)
RBC # BLD: 4.4 M/UL — SIGNIFICANT CHANGE UP (ref 4.2–5.4)
RBC # FLD: 12.6 % — SIGNIFICANT CHANGE UP (ref 11.5–14.5)
SODIUM SERPL-SCNC: 143 MMOL/L — SIGNIFICANT CHANGE UP (ref 135–146)
WBC # BLD: 7.59 K/UL — SIGNIFICANT CHANGE UP (ref 4.8–10.8)
WBC # FLD AUTO: 7.59 K/UL — SIGNIFICANT CHANGE UP (ref 4.8–10.8)

## 2024-05-10 PROCEDURE — 99284 EMERGENCY DEPT VISIT MOD MDM: CPT | Mod: 25

## 2024-05-10 PROCEDURE — 70450 CT HEAD/BRAIN W/O DYE: CPT | Mod: 26,MC

## 2024-05-10 PROCEDURE — 90471 IMMUNIZATION ADMIN: CPT

## 2024-05-10 PROCEDURE — 72170 X-RAY EXAM OF PELVIS: CPT | Mod: 26

## 2024-05-10 PROCEDURE — 70450 CT HEAD/BRAIN W/O DYE: CPT | Mod: MC

## 2024-05-10 PROCEDURE — 85025 COMPLETE CBC W/AUTO DIFF WBC: CPT

## 2024-05-10 PROCEDURE — 73590 X-RAY EXAM OF LOWER LEG: CPT | Mod: LT

## 2024-05-10 PROCEDURE — 72170 X-RAY EXAM OF PELVIS: CPT

## 2024-05-10 PROCEDURE — 80053 COMPREHEN METABOLIC PANEL: CPT

## 2024-05-10 PROCEDURE — 71045 X-RAY EXAM CHEST 1 VIEW: CPT | Mod: 26

## 2024-05-10 PROCEDURE — 72125 CT NECK SPINE W/O DYE: CPT | Mod: MC

## 2024-05-10 PROCEDURE — 72125 CT NECK SPINE W/O DYE: CPT | Mod: 26,MC

## 2024-05-10 PROCEDURE — 83690 ASSAY OF LIPASE: CPT

## 2024-05-10 PROCEDURE — 90714 TD VACC NO PRESV 7 YRS+ IM: CPT

## 2024-05-10 PROCEDURE — 71260 CT THORAX DX C+: CPT | Mod: MC

## 2024-05-10 PROCEDURE — 74177 CT ABD & PELVIS W/CONTRAST: CPT | Mod: MC

## 2024-05-10 PROCEDURE — 36415 COLL VENOUS BLD VENIPUNCTURE: CPT

## 2024-05-10 PROCEDURE — 71045 X-RAY EXAM CHEST 1 VIEW: CPT

## 2024-05-10 PROCEDURE — 99285 EMERGENCY DEPT VISIT HI MDM: CPT

## 2024-05-10 PROCEDURE — 71260 CT THORAX DX C+: CPT | Mod: 26,MC

## 2024-05-10 PROCEDURE — 73590 X-RAY EXAM OF LOWER LEG: CPT | Mod: 26,LT

## 2024-05-10 PROCEDURE — 74177 CT ABD & PELVIS W/CONTRAST: CPT | Mod: 26,MC

## 2024-05-10 RX ORDER — TETANUS AND DIPHTHERIA TOXOIDS ADSORBED 2; 2 [LF]/.5ML; [LF]/.5ML
0.5 INJECTION INTRAMUSCULAR ONCE
Refills: 0 | Status: COMPLETED | OUTPATIENT
Start: 2024-05-10 | End: 2024-05-10

## 2024-05-10 RX ORDER — TETANUS TOXOID, REDUCED DIPHTHERIA TOXOID AND ACELLULAR PERTUSSIS VACCINE, ADSORBED 5; 2.5; 8; 8; 2.5 [IU]/.5ML; [IU]/.5ML; UG/.5ML; UG/.5ML; UG/.5ML
0.5 SUSPENSION INTRAMUSCULAR ONCE
Refills: 0 | Status: DISCONTINUED | OUTPATIENT
Start: 2024-05-10 | End: 2024-05-10

## 2024-05-10 RX ADMIN — TETANUS AND DIPHTHERIA TOXOIDS ADSORBED 0.5 MILLILITER(S): 2; 2 INJECTION INTRAMUSCULAR at 11:50
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
## 2024-05-10 NOTE — ED ADULT NURSE NOTE - ALCOHOL PRE SCREEN (AUDIT - C)
Thank you for choosing the Pulmonary Services Department, at [facility] [place], as your Pulmonary Specialists.  We actively use feedback to constantly improve and deliver the best care possible. To provide the best experience, we are collecting feedback from you on how we performed.  You may receive a survey in the mail or through your e-mail to evaluate how we did. Please take a moment and share your thoughts.      If for any reason you feel that we did not meet your expectations or you want to share a positive experience, please give us a call. Your feedback helps us know how we are doing and what we can be doing better.    Office hours: 8:00 am to 4:30 pm, Monday - Friday  Phone: [phone] Option #2      YOUR TEST RESULTS    If you have any concerns regarding any testing ordered with your visit, please contact our office at the above number.    Otherwise, your test results will be communicated to you in one of the following ways:    - Follow-up office visit  - Phone call  - Through [facility]  - Mailed letter           Statement Selected

## 2024-05-10 NOTE — ED PROVIDER NOTE - PHYSICAL EXAMINATION
CONSTITUTIONAL: Well-developed; well-nourished; in no acute distress.   SKIN: warm, dry. no ecchymosis.   HEAD: Normocephalic; atraumatic.  EYES: PERRL, EOMI, no conjunctival erythema  ENT: No nasal discharge; airway clear.  NECK: Supple; non tender.  CARD: S1, S2 normal;  Regular rate and rhythm.   RESP: No wheezes, rales or rhonchi. + tenderness to left lateral chest wall tenderness. no crepitus. no respiratory distress.   ABD: soft non tender, non distended, no rebound or guarding  EXT: Normal ROM.  5/5 strength in all 4 extremities. no midline spinal tenderness. + tenderness to the left lateral shin. neurovascularly intact.   LYMPH: No acute cervical adenopathy.  NEURO: Alert, oriented, grossly unremarkable. neurovascularly intact  PSYCH: Cooperative, appropriate.

## 2024-05-10 NOTE — ED PROVIDER NOTE - PATIENT PORTAL LINK FT
You can access the FollowMyHealth Patient Portal offered by [facility] by registering at the following website: [email or web address]. By joining [facility]’s FollowMyHealth portal, you will also be able to view your health information using other applications (apps) compatible with our system.

## 2024-05-10 NOTE — ED ADULT NURSE NOTE - NSFALLUNIVINTERV_ED_ALL_ED
Bed/Stretcher in lowest position, wheels locked, appropriate side rails in place/Call bell, personal items and telephone in reach/Instruct patient to call for assistance before getting out of bed/chair/stretcher/Non-slip footwear applied when patient is off stretcher/[place] to call system/Physically safe environment - no spills, clutter or unnecessary equipment/Purposeful proactive rounding/Room/bathroom lighting operational, light cord in reach
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
## 2024-05-10 NOTE — ED PROVIDER NOTE - EKG/XRAY ADDITIONAL INFORMATION
ED CXR film prelim, my independent interpretation - Dr. Olson: [No PTX, No infiltrates, No Free air]  ED pelvic and extremity film prelim, my independent interpretation - Dr. Olson: [No fx or dislocation]
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
## 2024-07-03 ENCOUNTER — APPOINTMENT (OUTPATIENT)
Dept: CARDIOLOGY | Facility: CLINIC | Age: 79
End: 2024-07-03
Payer: MEDICARE

## 2024-07-03 VITALS
OXYGEN SATURATION: 99 % | HEART RATE: 61 BPM | DIASTOLIC BLOOD PRESSURE: 82 MMHG | HEIGHT: 61 IN | WEIGHT: 101 LBS | SYSTOLIC BLOOD PRESSURE: 134 MMHG | RESPIRATION RATE: 16 BRPM | BODY MASS INDEX: 19.07 KG/M2

## 2024-07-03 DIAGNOSIS — I10 ESSENTIAL (PRIMARY) HYPERTENSION: ICD-10-CM

## 2024-07-03 PROCEDURE — 99213 OFFICE O/P EST LOW 20 MIN: CPT

## 2024-07-03 PROCEDURE — 93000 ELECTROCARDIOGRAM COMPLETE: CPT

## 2025-01-31 ENCOUNTER — NON-APPOINTMENT (OUTPATIENT)
Age: 80
End: 2025-01-31

## 2025-01-31 ENCOUNTER — APPOINTMENT (OUTPATIENT)
Dept: CARDIOLOGY | Facility: CLINIC | Age: 80
End: 2025-01-31
Payer: MEDICARE

## 2025-01-31 VITALS
BODY MASS INDEX: 18.58 KG/M2 | OXYGEN SATURATION: 98 % | SYSTOLIC BLOOD PRESSURE: 130 MMHG | WEIGHT: 101 LBS | DIASTOLIC BLOOD PRESSURE: 80 MMHG | HEIGHT: 62 IN | HEART RATE: 64 BPM

## 2025-01-31 PROCEDURE — 93000 ELECTROCARDIOGRAM COMPLETE: CPT

## 2025-01-31 PROCEDURE — 99213 OFFICE O/P EST LOW 20 MIN: CPT

## 2025-07-18 ENCOUNTER — APPOINTMENT (OUTPATIENT)
Dept: CARDIOLOGY | Facility: CLINIC | Age: 80
End: 2025-07-18